# Patient Record
Sex: MALE | Race: WHITE | URBAN - NONMETROPOLITAN AREA
[De-identification: names, ages, dates, MRNs, and addresses within clinical notes are randomized per-mention and may not be internally consistent; named-entity substitution may affect disease eponyms.]

---

## 2024-04-19 ENCOUNTER — TELEPHONE (OUTPATIENT)
Dept: FAMILY MEDICINE CLINIC | Age: 84
End: 2024-04-19

## 2024-04-19 NOTE — TELEPHONE ENCOUNTER
----- Message from María Ponce sent at 4/19/2024  9:52 AM EDT -----  Regarding: ECC Appointment Request  ECC Appointment Request    Patient needs appointment for ECC Appointment Type: New to Provider.    Reason for Appointment Request: Requested Provider unavailable: the patient requesting for an appointment with Dr. Nevaeh Mckeon to be an establish provider.  --------------------------------------------------------------------------------------------------------------------------    Relationship to Patient: Guardian Son: Dick Lane    Call Back Information: OK to leave message on voicemail  Preferred Call Back Number: Phone [phone]

## 2024-04-19 NOTE — TELEPHONE ENCOUNTER
LVM for patient to return call. Nevaeh is currently not accepting new patients but we can offer appointment with Nickolas or Cassi.

## 2024-05-24 ENCOUNTER — OFFICE VISIT (OUTPATIENT)
Dept: ORTHOPEDIC SURGERY | Age: 84
End: 2024-05-24

## 2024-05-24 ENCOUNTER — OFFICE VISIT (OUTPATIENT)
Dept: FAMILY MEDICINE CLINIC | Age: 84
End: 2024-05-24

## 2024-05-24 VITALS
HEIGHT: 70 IN | WEIGHT: 286 LBS | SYSTOLIC BLOOD PRESSURE: 138 MMHG | DIASTOLIC BLOOD PRESSURE: 84 MMHG | BODY MASS INDEX: 40.94 KG/M2

## 2024-05-24 DIAGNOSIS — Z91.09 ENVIRONMENTAL ALLERGIES: ICD-10-CM

## 2024-05-24 DIAGNOSIS — I25.10 CORONARY ARTERY DISEASE INVOLVING NATIVE CORONARY ARTERY OF NATIVE HEART WITHOUT ANGINA PECTORIS: ICD-10-CM

## 2024-05-24 DIAGNOSIS — Z76.89 ENCOUNTER TO ESTABLISH CARE: Primary | ICD-10-CM

## 2024-05-24 DIAGNOSIS — E78.2 MIXED HYPERLIPIDEMIA: ICD-10-CM

## 2024-05-24 DIAGNOSIS — I10 PRIMARY HYPERTENSION: ICD-10-CM

## 2024-05-24 DIAGNOSIS — N40.1 BENIGN PROSTATIC HYPERPLASIA WITH NOCTURIA: ICD-10-CM

## 2024-05-24 DIAGNOSIS — R35.1 BENIGN PROSTATIC HYPERPLASIA WITH NOCTURIA: ICD-10-CM

## 2024-05-24 DIAGNOSIS — M25.551 BILATERAL HIP PAIN: ICD-10-CM

## 2024-05-24 DIAGNOSIS — M25.552 BILATERAL HIP PAIN: ICD-10-CM

## 2024-05-24 DIAGNOSIS — M25.512 CHRONIC LEFT SHOULDER PAIN: ICD-10-CM

## 2024-05-24 DIAGNOSIS — M25.561 RIGHT KNEE PAIN, UNSPECIFIED CHRONICITY: Primary | ICD-10-CM

## 2024-05-24 DIAGNOSIS — G89.29 CHRONIC LEFT SHOULDER PAIN: ICD-10-CM

## 2024-05-24 DIAGNOSIS — E66.01 CLASS 3 SEVERE OBESITY WITH SERIOUS COMORBIDITY AND BODY MASS INDEX (BMI) OF 40.0 TO 44.9 IN ADULT, UNSPECIFIED OBESITY TYPE (HCC): ICD-10-CM

## 2024-05-24 PROBLEM — E66.813 CLASS 3 SEVERE OBESITY WITH SERIOUS COMORBIDITY AND BODY MASS INDEX (BMI) OF 40.0 TO 44.9 IN ADULT: Status: ACTIVE | Noted: 2024-05-24

## 2024-05-24 RX ORDER — SOTALOL HYDROCHLORIDE 80 MG/1
80 TABLET ORAL DAILY
COMMUNITY
Start: 2024-05-16

## 2024-05-24 RX ORDER — ACETAMINOPHEN 500 MG
500 TABLET ORAL EVERY 6 HOURS PRN
COMMUNITY

## 2024-05-24 RX ORDER — APIXABAN 5 MG/1
5 TABLET, FILM COATED ORAL 2 TIMES DAILY
COMMUNITY
Start: 2024-05-23

## 2024-05-24 RX ORDER — GABAPENTIN 300 MG/1
300 CAPSULE ORAL 3 TIMES DAILY
COMMUNITY
Start: 2024-03-15

## 2024-05-24 RX ORDER — CETIRIZINE HYDROCHLORIDE 5 MG/1
5 TABLET ORAL DAILY
COMMUNITY

## 2024-05-24 RX ORDER — ROSUVASTATIN CALCIUM 5 MG/1
5 TABLET, COATED ORAL DAILY
COMMUNITY
Start: 2024-04-08

## 2024-05-24 RX ORDER — TAMSULOSIN HYDROCHLORIDE 0.4 MG/1
0.4 CAPSULE ORAL DAILY
COMMUNITY
Start: 2024-03-25

## 2024-05-24 SDOH — ECONOMIC STABILITY: FOOD INSECURITY: WITHIN THE PAST 12 MONTHS, THE FOOD YOU BOUGHT JUST DIDN'T LAST AND YOU DIDN'T HAVE MONEY TO GET MORE.: NEVER TRUE

## 2024-05-24 SDOH — ECONOMIC STABILITY: HOUSING INSECURITY
IN THE LAST 12 MONTHS, WAS THERE A TIME WHEN YOU DID NOT HAVE A STEADY PLACE TO SLEEP OR SLEPT IN A SHELTER (INCLUDING NOW)?: NO

## 2024-05-24 SDOH — ECONOMIC STABILITY: INCOME INSECURITY: HOW HARD IS IT FOR YOU TO PAY FOR THE VERY BASICS LIKE FOOD, HOUSING, MEDICAL CARE, AND HEATING?: NOT HARD AT ALL

## 2024-05-24 SDOH — ECONOMIC STABILITY: FOOD INSECURITY: WITHIN THE PAST 12 MONTHS, YOU WORRIED THAT YOUR FOOD WOULD RUN OUT BEFORE YOU GOT MONEY TO BUY MORE.: NEVER TRUE

## 2024-05-24 ASSESSMENT — ENCOUNTER SYMPTOMS
COUGH: 0
BACK PAIN: 1
CHEST TIGHTNESS: 0
CONSTIPATION: 0
EYE DISCHARGE: 0
EYE PAIN: 0
COLOR CHANGE: 0
SORE THROAT: 0
DIARRHEA: 0
ABDOMINAL DISTENTION: 0
ABDOMINAL PAIN: 0
SHORTNESS OF BREATH: 0

## 2024-05-24 ASSESSMENT — PATIENT HEALTH QUESTIONNAIRE - PHQ9
SUM OF ALL RESPONSES TO PHQ9 QUESTIONS 1 & 2: 2
SUM OF ALL RESPONSES TO PHQ QUESTIONS 1-9: 3
5. POOR APPETITE OR OVEREATING: NOT AT ALL
SUM OF ALL RESPONSES TO PHQ QUESTIONS 1-9: 3
SUM OF ALL RESPONSES TO PHQ QUESTIONS 1-9: 3
6. FEELING BAD ABOUT YOURSELF - OR THAT YOU ARE A FAILURE OR HAVE LET YOURSELF OR YOUR FAMILY DOWN: NOT AT ALL
7. TROUBLE CONCENTRATING ON THINGS, SUCH AS READING THE NEWSPAPER OR WATCHING TELEVISION: NOT AT ALL
10. IF YOU CHECKED OFF ANY PROBLEMS, HOW DIFFICULT HAVE THESE PROBLEMS MADE IT FOR YOU TO DO YOUR WORK, TAKE CARE OF THINGS AT HOME, OR GET ALONG WITH OTHER PEOPLE: NOT DIFFICULT AT ALL
1. LITTLE INTEREST OR PLEASURE IN DOING THINGS: NOT AT ALL
2. FEELING DOWN, DEPRESSED OR HOPELESS: MORE THAN HALF THE DAYS
4. FEELING TIRED OR HAVING LITTLE ENERGY: NOT AT ALL
3. TROUBLE FALLING OR STAYING ASLEEP: SEVERAL DAYS
SUM OF ALL RESPONSES TO PHQ QUESTIONS 1-9: 3
9. THOUGHTS THAT YOU WOULD BE BETTER OFF DEAD, OR OF HURTING YOURSELF: NOT AT ALL
8. MOVING OR SPEAKING SO SLOWLY THAT OTHER PEOPLE COULD HAVE NOTICED. OR THE OPPOSITE, BEING SO FIGETY OR RESTLESS THAT YOU HAVE BEEN MOVING AROUND A LOT MORE THAN USUAL: NOT AT ALL

## 2024-05-24 NOTE — PROGRESS NOTES
[facility] Medicine  Establish care visit   2024    Saw Lane (:  1940) is a 84 y.o. male, here to establish care.    Chief Complaint   Patient presents with    New Patient     [facility]     Leg Pain     Burning and itching        ASSESSMENT/ PLAN  1. Encounter to establish care  -Patient here to establish care    2. Primary hypertension  -Stable on sotalol    3. Coronary artery disease involving native coronary artery of native heart without angina pectoris  -Apparently has a history of x 1 stent.  Attempting to obtain records    4. Mixed hyperlipidemia  -History of coronary artery disease and x 1 cardiac stent  -Continue rosuvastatin 5 mg for now    5. Benign prostatic hyperplasia with nocturia  -Stable on Flomax    6. Environmental allergies  -Zyrtec as needed    7. Class 3 severe obesity with serious comorbidity and body mass index (BMI) of 40.0 to 44.9 in adult, unspecified obesity type (HCC)  -Spent about 15 minutes discussing dietary changes and lifestyle modifications to facilitate weight loss  - [place] [facility]  OBESITY 15M    8. Chronic left shoulder pain  -Following up with orthopedics    9. Bilateral hip pain  -Following up with orthopedics           I have personally spent 45 minutes reviewing chart, patient education, clinical care with patient and education to patient and family, as well as consulting with other team members and clinical references.      Imaging:    Return in about 3 months (around 2024) for Routine. .    HPI  Patient seen in office today to establish care.    Apparently he has medical history of coronary artery disease with 1 stent, BPH, hypertension, and numbness and tingling in bilateral lower extremities.    He is on Eliquis for unclear reasons.  He denies any history of blood clots or atrial fibrillation.    He does follow with Dr. Dobbs cardiologist.     He also has complaint of left shoulder pain that has been ongoing and chronic for several

## 2024-05-24 NOTE — PROGRESS NOTES
He came in today's just to say silke and to meet me in the say that he is getting records transferred down here for me to take care of him in the future.  Because of that because of his issues these had with healthcare system in [place] that he has been dealing with, I told him once he gets all of his records and a disc with his x-rays that he should return for follow-up.

## 2024-07-05 RX ORDER — SOTALOL HYDROCHLORIDE 80 MG/1
80 TABLET ORAL DAILY
Qty: 60 TABLET | Refills: 2 | Status: SHIPPED | OUTPATIENT
Start: 2024-07-05

## 2024-07-05 RX ORDER — ROSUVASTATIN CALCIUM 5 MG/1
5 TABLET, COATED ORAL DAILY
Qty: 30 TABLET | Refills: 2 | Status: SHIPPED | OUTPATIENT
Start: 2024-07-05

## 2024-07-23 ENCOUNTER — TELEMEDICINE (OUTPATIENT)
Dept: FAMILY MEDICINE CLINIC | Age: 84
End: 2024-07-23
Payer: MEDICARE

## 2024-07-23 DIAGNOSIS — N40.1 BENIGN PROSTATIC HYPERPLASIA WITH NOCTURIA: ICD-10-CM

## 2024-07-23 DIAGNOSIS — R35.1 BENIGN PROSTATIC HYPERPLASIA WITH NOCTURIA: ICD-10-CM

## 2024-07-23 DIAGNOSIS — E78.2 MIXED HYPERLIPIDEMIA: ICD-10-CM

## 2024-07-23 DIAGNOSIS — Z00.00 WELCOME TO MEDICARE PREVENTIVE VISIT: Primary | ICD-10-CM

## 2024-07-23 DIAGNOSIS — Z74.09 IMPAIRED MOBILITY AND ACTIVITIES OF DAILY LIVING: ICD-10-CM

## 2024-07-23 DIAGNOSIS — Z78.9 IMPAIRED MOBILITY AND ACTIVITIES OF DAILY LIVING: ICD-10-CM

## 2024-07-23 DIAGNOSIS — I10 PRIMARY HYPERTENSION: ICD-10-CM

## 2024-07-23 DIAGNOSIS — I25.10 CORONARY ARTERY DISEASE INVOLVING NATIVE CORONARY ARTERY OF NATIVE HEART WITHOUT ANGINA PECTORIS: ICD-10-CM

## 2024-07-23 PROCEDURE — G0402 INITIAL PREVENTIVE EXAM: HCPCS

## 2024-07-23 PROCEDURE — 1123F ACP DISCUSS/DSCN MKR DOCD: CPT

## 2024-07-23 NOTE — PROGRESS NOTES
Medicare Annual Wellness Visit    Saw Lane is here for No chief complaint on file.    Assessment & Plan   Welcome to Medicare preventive visit  -AWV today, complains of BLE leg pain and some back pain limiting ADLS and walking ability.    Primary hypertension  -stable on sotalol.     Coronary artery disease involving native coronary artery of native heart without angina pectoris  -x1 cardiac stent, no chest pain or SOB    Mixed hyperlipidemia  -Stable on crestor.   -continue    Benign prostatic hyperplasia with nocturia  -Stable on flomax    Impaired mobility and activities of daily living  -Using walker and cane  -using motor grocery cart.       Recommendations for Preventive Services Due: see orders and patient instructions/AVS.  Recommended screening schedule for the next 5-10 years is provided to the patient in written form: see Patient Instructions/AVS.     No follow-ups on file.     Subjective       Patient's complete Health Risk Assessment and screening values have been reviewed and are found in Flowsheets. The following problems were reviewed today and where indicated follow up appointments were made and/or referrals ordered.    Positive Risk Factor Screenings with Interventions:    Fall Risk:  Do you feel unsteady or are you worried about falling? : (!) yes  2 or more falls in past year?: (!) yes  Fall with injury in past year?: no     Interventions:    Reviewed medications, home hazards, visual acuity, and co-morbidities that can increase risk for falls  Using cane, walker and motorized grocery cart    Cognitive:      Words recalled: 2 Words Recalled     Total Score Interpretation: Abnormal Mini-Cog  Interventions:  Patient declines any further evaluation or treatment  Memory is grossly normal.     Depression:  PHQ-2 Score: 2  PHQ-9 Total Score: 7  Total Score Interpretation: 5-9 = mild depression  Interventions:  Patient declines any further evaluation or treatment          General HRA

## 2024-07-23 NOTE — PATIENT INSTRUCTIONS
review of your medical history including lifestyle, illnesses that may run in your family, and various assessments and screenings as appropriate.    After reviewing your medical record and screening and assessments performed today your provider may have ordered immunizations, labs, imaging, and/or referrals for you.  A list of these orders (if applicable) as well as your Preventive Care list are included within your After Visit Summary for your review.    Other Preventive Recommendations:    A preventive eye exam performed by an eye specialist is recommended every 1-2 years to screen for glaucoma; cataracts, macular degeneration, and other eye disorders.  A preventive dental visit is recommended every 6 months.  Try to get at least 150 minutes of exercise per week or 10,000 steps per day on a pedometer .  Order or download the FREE \"Exercise & Physical Activity: Your Everyday Guide\" from [employer]. Call 1-983.987.1432 or search [employer] online.  You need 1992-2253 mg of calcium and 4976-0046 IU of vitamin D per day. It is possible to meet your calcium requirement with diet alone, but a vitamin D supplement is usually necessary to meet this goal.  When exposed to the sun, use a sunscreen that protects against both UVA and UVB radiation with an SPF of 30 or greater. Reapply every 2 to 3 hours or after sweating, drying off with a towel, or swimming.  Always wear a seat belt when traveling in a car. Always wear a helmet when riding a bicycle or motorcycle.

## 2024-08-12 RX ORDER — ROSUVASTATIN CALCIUM 5 MG/1
5 TABLET, COATED ORAL DAILY
Qty: 90 TABLET | Refills: 0 | Status: SHIPPED | OUTPATIENT
Start: 2024-08-12 | End: 2024-08-15 | Stop reason: SDUPTHER

## 2024-08-15 ENCOUNTER — OFFICE VISIT (OUTPATIENT)
Dept: FAMILY MEDICINE CLINIC | Age: 84
End: 2024-08-15
Payer: MEDICARE

## 2024-08-15 VITALS — DIASTOLIC BLOOD PRESSURE: 78 MMHG | BODY MASS INDEX: 33.62 KG/M2 | SYSTOLIC BLOOD PRESSURE: 112 MMHG | WEIGHT: 231 LBS

## 2024-08-15 DIAGNOSIS — R35.1 BENIGN PROSTATIC HYPERPLASIA WITH NOCTURIA: ICD-10-CM

## 2024-08-15 DIAGNOSIS — E66.9 CLASS 1 OBESITY WITHOUT SERIOUS COMORBIDITY WITH BODY MASS INDEX (BMI) OF 33.0 TO 33.9 IN ADULT, UNSPECIFIED OBESITY TYPE: ICD-10-CM

## 2024-08-15 DIAGNOSIS — M25.552 CHRONIC LEFT HIP PAIN: ICD-10-CM

## 2024-08-15 DIAGNOSIS — N40.1 BENIGN PROSTATIC HYPERPLASIA WITH NOCTURIA: ICD-10-CM

## 2024-08-15 DIAGNOSIS — I10 PRIMARY HYPERTENSION: Primary | ICD-10-CM

## 2024-08-15 DIAGNOSIS — E78.2 MIXED HYPERLIPIDEMIA: ICD-10-CM

## 2024-08-15 DIAGNOSIS — G89.29 CHRONIC LEFT HIP PAIN: ICD-10-CM

## 2024-08-15 PROBLEM — E66.811 CLASS 1 OBESITY WITHOUT SERIOUS COMORBIDITY WITH BODY MASS INDEX (BMI) OF 33.0 TO 33.9 IN ADULT: Status: ACTIVE | Noted: 2024-05-24

## 2024-08-15 PROCEDURE — 3074F SYST BP LT 130 MM HG: CPT

## 2024-08-15 PROCEDURE — 1123F ACP DISCUSS/DSCN MKR DOCD: CPT

## 2024-08-15 PROCEDURE — 99214 OFFICE O/P EST MOD 30 MIN: CPT

## 2024-08-15 PROCEDURE — 3078F DIAST BP <80 MM HG: CPT

## 2024-08-15 PROCEDURE — G2211 COMPLEX E/M VISIT ADD ON: HCPCS

## 2024-08-15 RX ORDER — METHYLPREDNISOLONE 4 MG/1
TABLET ORAL
Qty: 1 KIT | Refills: 0 | Status: SHIPPED | OUTPATIENT
Start: 2024-08-15 | End: 2024-08-21

## 2024-08-15 RX ORDER — TAMSULOSIN HYDROCHLORIDE 0.4 MG/1
0.4 CAPSULE ORAL DAILY
Qty: 90 CAPSULE | Refills: 1 | Status: SHIPPED | OUTPATIENT
Start: 2024-08-15

## 2024-08-15 RX ORDER — ROSUVASTATIN CALCIUM 5 MG/1
5 TABLET, COATED ORAL DAILY
Qty: 90 TABLET | Refills: 1 | Status: SHIPPED | OUTPATIENT
Start: 2024-08-15

## 2024-08-15 RX ORDER — TRAMADOL HYDROCHLORIDE 50 MG/1
50 TABLET ORAL EVERY 4 HOURS PRN
Qty: 18 TABLET | Refills: 0 | Status: SHIPPED | OUTPATIENT
Start: 2024-08-15 | End: 2024-08-18

## 2024-08-15 ASSESSMENT — ENCOUNTER SYMPTOMS
CONSTIPATION: 0
EYE PAIN: 0
COUGH: 0
ABDOMINAL PAIN: 0
CHEST TIGHTNESS: 0
DIARRHEA: 0
BACK PAIN: 1
SHORTNESS OF BREATH: 0
EYE DISCHARGE: 0
ABDOMINAL DISTENTION: 0
SORE THROAT: 0
COLOR CHANGE: 0

## 2024-08-15 NOTE — PROGRESS NOTES
[facility]    8/15/2024    Saw Lane (:  1940) is a 84 y.o. male, here for routine follow-up    Chief Complaint   Patient presents with    Hypertension    Pain        ASSESSMENT/ PLAN  1. Primary hypertension  -Stable on sotalol    2. Class 1 obesity without serious comorbidity with body mass index (BMI) of 33.0 to 33.9 in adult, unspecified obesity type  -Patient down 5 pounds since last visit  -Reiterated dietary change    3. Chronic left hip pain  -Tenderness to left hip  -Suspicious for OA of the left hip.  Referral to orthopedics  - Chandler Winter MD, Orthopedic Surgery (Shoulder; Hip; Knee), [place]  - methylPREDNISolone (MEDROL DOSEPACK) 4 MG tablet; Take by mouth.  Dispense: 1 kit; Refill: 0  - traMADol (ULTRAM) 50 MG tablet; Take 1 tablet by mouth every 4 hours as needed for Pain for up to 3 days. Intended supply: 3 days. Take lowest dose possible to manage pain Max Daily Amount: 300 mg  Dispense: 18 tablet; Refill: 0    4. Mixed hyperlipidemia  -Refill rosuvastatin  - rosuvastatin (CRESTOR) 5 MG tablet; Take 1 tablet by mouth daily  Dispense: 90 tablet; Refill: 1    5. Benign prostatic hyperplasia with nocturia  -Stable on Flomax.  - tamsulosin (FLOMAX) 0.4 MG capsule; Take 1 capsule by mouth daily  Dispense: 90 capsule; Refill: 1  Imaging:    Return in about 3 months (around 11/15/2024) for Routine, ELIQUIS???.    HPI  Patient seen in office today to establish care.    Apparently he has medical history of coronary artery disease with 1 stent, BPH, hypertension, and numbness and tingling in bilateral lower extremities.    He is on Eliquis for unclear reasons.  He denies any history of blood clots or atrial fibrillation.    He does follow with Dr. Dobbs cardiologist.     He also has complaint of left shoulder pain that has been ongoing and chronic for several years.  He did receive a corticosteroid injection in the shoulder in the past that did help with left

## 2024-09-06 ENCOUNTER — OFFICE VISIT (OUTPATIENT)
Dept: ORTHOPEDIC SURGERY | Age: 84
End: 2024-09-06
Payer: MEDICARE

## 2024-09-06 VITALS — BODY MASS INDEX: 34.21 KG/M2 | HEIGHT: 69 IN | WEIGHT: 231 LBS

## 2024-09-06 DIAGNOSIS — M16.12 PRIMARY OSTEOARTHRITIS OF LEFT HIP: Primary | ICD-10-CM

## 2024-09-06 DIAGNOSIS — G89.29 BILATERAL CHRONIC KNEE PAIN: ICD-10-CM

## 2024-09-06 DIAGNOSIS — M25.552 LEFT HIP PAIN: ICD-10-CM

## 2024-09-06 DIAGNOSIS — M25.562 BILATERAL CHRONIC KNEE PAIN: ICD-10-CM

## 2024-09-06 DIAGNOSIS — M25.561 BILATERAL CHRONIC KNEE PAIN: ICD-10-CM

## 2024-09-06 PROCEDURE — 99203 OFFICE O/P NEW LOW 30 MIN: CPT | Performed by: ORTHOPAEDIC SURGERY

## 2024-09-06 PROCEDURE — 1123F ACP DISCUSS/DSCN MKR DOCD: CPT | Performed by: ORTHOPAEDIC SURGERY

## 2024-09-06 RX ORDER — TRAMADOL HYDROCHLORIDE 50 MG/1
50 TABLET ORAL EVERY 6 HOURS PRN
Qty: 28 TABLET | Refills: 0 | Status: SHIPPED | OUTPATIENT
Start: 2024-09-06 | End: 2024-09-13

## 2024-09-06 RX ORDER — METHYLPREDNISOLONE 4 MG
TABLET, DOSE PACK ORAL
Qty: 1 KIT | Refills: 1 | Status: SHIPPED | OUTPATIENT
Start: 2024-09-06 | End: 2024-09-12

## 2024-09-06 NOTE — PROGRESS NOTES
unremarkable    Strength: Limited by pain left hip    Special Tests:      Skin: There are no rashes, ulcerations or lesions.    Gait: Very antalgic favoring the left side and using a cane for ambulation    Reflex unremarkable    Additional Comments:       Additional Examinations:         Right Upper Extremity:  Examination of the right upper extremity does not show any tenderness, deformity or injury.  Range of motion is unremarkable.  There is no gross instability.  There are no rashes, ulcerations or lesions.  Strength and tone are normal.    Radiology:     X-rays 3 views of the left hip reveals end-stage osteoarthritis of the left hip    X-rays 3 views of both knees are unremarkable for acute or chronic pathology       Assessment : End-stage arthritis left hip with compensatory pain in both knees from altered gait    Impression:  Encounter Diagnoses   Name Primary?    Left hip pain Yes    Left knee pain, unspecified chronicity        Office Procedures:  Orders Placed This Encounter   Procedures    XR HIP LEFT (2-3 VIEWS)     Standing Status:   Future     Number of Occurrences:   1     Standing Expiration Date:   9/6/2025    XR KNEE LEFT (3 VIEWS)     Standing Status:   Future     Number of Occurrences:   1     Standing Expiration Date:   9/6/2025    Kee Perkins DO, Orthopedics and Sports Medicine (Hip; Knee; Shoulder)[facility]     Referral Priority:   Routine     Referral Type:   Eval and Treat     Referral Reason:   Specialty Services Required     Referred to Provider:   Kee Youngblood DO     Requested Specialty:   Orthopedic Surgery     Number of Visits Requested:   1       Treatment Plan: At this time I recommend he be given some tramadol to use sparingly for pain, a Medrol Dosepak that he can take and although he requested cortisone injections to his hip I told him that I am going to refer him to Dr. Kee Youngblood for evaluation and consideration of the total hip replacement surgery.

## 2024-09-18 ENCOUNTER — TELEMEDICINE (OUTPATIENT)
Dept: FAMILY MEDICINE CLINIC | Age: 84
End: 2024-09-18

## 2024-09-18 DIAGNOSIS — Z74.09 IMPAIRED MOBILITY AND ACTIVITIES OF DAILY LIVING: ICD-10-CM

## 2024-09-18 DIAGNOSIS — G89.29 CHRONIC LEFT HIP PAIN: ICD-10-CM

## 2024-09-18 DIAGNOSIS — M25.552 CHRONIC LEFT HIP PAIN: ICD-10-CM

## 2024-09-18 DIAGNOSIS — Z78.9 IMPAIRED MOBILITY AND ACTIVITIES OF DAILY LIVING: ICD-10-CM

## 2024-09-18 DIAGNOSIS — E78.2 MIXED HYPERLIPIDEMIA: ICD-10-CM

## 2024-09-18 DIAGNOSIS — Z00.00 WELCOME TO MEDICARE PREVENTIVE VISIT: Primary | ICD-10-CM

## 2024-09-18 DIAGNOSIS — I48.0 PAROXYSMAL ATRIAL FIBRILLATION (HCC): ICD-10-CM

## 2024-09-18 ASSESSMENT — PATIENT HEALTH QUESTIONNAIRE - PHQ9
8. MOVING OR SPEAKING SO SLOWLY THAT OTHER PEOPLE COULD HAVE NOTICED. OR THE OPPOSITE, BEING SO FIGETY OR RESTLESS THAT YOU HAVE BEEN MOVING AROUND A LOT MORE THAN USUAL: NOT AT ALL
SUM OF ALL RESPONSES TO PHQ QUESTIONS 1-9: 5
3. TROUBLE FALLING OR STAYING ASLEEP: NOT AT ALL
4. FEELING TIRED OR HAVING LITTLE ENERGY: SEVERAL DAYS
5. POOR APPETITE OR OVEREATING: NOT AT ALL
6. FEELING BAD ABOUT YOURSELF - OR THAT YOU ARE A FAILURE OR HAVE LET YOURSELF OR YOUR FAMILY DOWN: NOT AT ALL
SUM OF ALL RESPONSES TO PHQ9 QUESTIONS 1 & 2: 4
2. FEELING DOWN, DEPRESSED OR HOPELESS: SEVERAL DAYS
SUM OF ALL RESPONSES TO PHQ QUESTIONS 1-9: 5
SUM OF ALL RESPONSES TO PHQ QUESTIONS 1-9: 5
10. IF YOU CHECKED OFF ANY PROBLEMS, HOW DIFFICULT HAVE THESE PROBLEMS MADE IT FOR YOU TO DO YOUR WORK, TAKE CARE OF THINGS AT HOME, OR GET ALONG WITH OTHER PEOPLE: NOT DIFFICULT AT ALL
7. TROUBLE CONCENTRATING ON THINGS, SUCH AS READING THE NEWSPAPER OR WATCHING TELEVISION: NOT AT ALL
SUM OF ALL RESPONSES TO PHQ QUESTIONS 1-9: 5
9. THOUGHTS THAT YOU WOULD BE BETTER OFF DEAD, OR OF HURTING YOURSELF: NOT AT ALL
1. LITTLE INTEREST OR PLEASURE IN DOING THINGS: NEARLY EVERY DAY

## 2024-09-18 ASSESSMENT — LIFESTYLE VARIABLES: HOW OFTEN DO YOU HAVE A DRINK CONTAINING ALCOHOL: NEVER

## 2024-09-20 ENCOUNTER — OFFICE VISIT (OUTPATIENT)
Dept: ORTHOPEDIC SURGERY | Age: 84
End: 2024-09-20
Payer: MEDICARE

## 2024-09-20 VITALS — WEIGHT: 230 LBS | BODY MASS INDEX: 34.07 KG/M2 | HEIGHT: 69 IN

## 2024-09-20 DIAGNOSIS — M16.12 PRIMARY OSTEOARTHRITIS OF LEFT HIP: Primary | ICD-10-CM

## 2024-09-20 PROCEDURE — 99214 OFFICE O/P EST MOD 30 MIN: CPT | Performed by: STUDENT IN AN ORGANIZED HEALTH CARE EDUCATION/TRAINING PROGRAM

## 2024-09-20 PROCEDURE — 1123F ACP DISCUSS/DSCN MKR DOCD: CPT | Performed by: STUDENT IN AN ORGANIZED HEALTH CARE EDUCATION/TRAINING PROGRAM

## 2024-09-24 ENCOUNTER — PREP FOR PROCEDURE (OUTPATIENT)
Dept: ORTHOPEDIC SURGERY | Age: 84
End: 2024-09-24

## 2024-09-24 DIAGNOSIS — M16.12 PRIMARY OSTEOARTHRITIS OF LEFT HIP: Primary | ICD-10-CM

## 2024-09-26 ENCOUNTER — ANESTHESIA EVENT (OUTPATIENT)
Dept: OPERATING ROOM | Age: 84
End: 2024-09-26
Payer: MEDICARE

## 2024-10-02 ENCOUNTER — OFFICE VISIT (OUTPATIENT)
Dept: FAMILY MEDICINE CLINIC | Age: 84
End: 2024-10-02
Payer: MEDICARE

## 2024-10-02 VITALS
BODY MASS INDEX: 35.29 KG/M2 | HEART RATE: 61 BPM | DIASTOLIC BLOOD PRESSURE: 82 MMHG | WEIGHT: 239 LBS | SYSTOLIC BLOOD PRESSURE: 127 MMHG

## 2024-10-02 DIAGNOSIS — M25.552 CHRONIC LEFT HIP PAIN: ICD-10-CM

## 2024-10-02 DIAGNOSIS — M16.12 PRIMARY OSTEOARTHRITIS OF LEFT HIP: ICD-10-CM

## 2024-10-02 DIAGNOSIS — Z01.818 PREOP EXAMINATION: Primary | ICD-10-CM

## 2024-10-02 DIAGNOSIS — I48.0 PAROXYSMAL ATRIAL FIBRILLATION (HCC): ICD-10-CM

## 2024-10-02 DIAGNOSIS — G89.29 CHRONIC LEFT HIP PAIN: ICD-10-CM

## 2024-10-02 PROCEDURE — 99214 OFFICE O/P EST MOD 30 MIN: CPT

## 2024-10-02 PROCEDURE — 3079F DIAST BP 80-89 MM HG: CPT

## 2024-10-02 PROCEDURE — 1123F ACP DISCUSS/DSCN MKR DOCD: CPT

## 2024-10-02 PROCEDURE — 3074F SYST BP LT 130 MM HG: CPT

## 2024-10-02 ASSESSMENT — ENCOUNTER SYMPTOMS
CHEST TIGHTNESS: 0
COLOR CHANGE: 0
COUGH: 0
CONSTIPATION: 0
SORE THROAT: 0
ABDOMINAL DISTENTION: 0
BACK PAIN: 1
SHORTNESS OF BREATH: 0
EYE PAIN: 0
DIARRHEA: 0
ABDOMINAL PAIN: 0
EYE DISCHARGE: 0

## 2024-10-02 NOTE — PROGRESS NOTES
Pre Op Med History  Cold/Chronic Cough/Tuberculosis  --  no  Bronchitis/COPD  --  no  Asthma/SOB  --  no  Rheumatic Fever/Heart Murmur  --  no  High Blood Pressure/Low Blood Pressure  --  no  Swelling of Feet/Fluid In Lungs  --  yes swelling in feet  Heart Attack/Chest Pain  --  no  Irregular, Fast Heartbeats  --  yes - afib  Do you bruise easily?  --  yes - blood thinner  Anemia  --  no  Diabetes/Low Blood Sugar  --  no  Are you Pregnant  --  N/A  Last Menstrual Period  --  N/A  Serious Illness During Pregnancy  --  N/A  Breast Disease  --  no  Kidney Disease  --  yes - kidney stone  Jaundice/Hepatitis  --  no  Hiatal Hernia/Peptic Ulcer Disease  --  yes - hernia  Convulsions/Epilepsy/Stroke  --  no  Polio/Meningitis Paralysis  --  no  Back Pain/ Slipped Disc  --  yes - back pain  Psychological Disease  --  no  Thyroid Disease  --  no  Glaucoma/Visual Impairment  --  no  Skeletal Deformities/Defects/Arthritis --  no  Loose Teeth/Caps on Front Teeth  --  yes - dentures  Have you had any Surgery  --  yes - No past surgical history on file.   Any History of anesthesia complication in self or family  -- no  Prostate Disease  --  no  Cancer  --  no  DVT/PE/PVD --  no  Weight Loss/Gain  --  no     Saw Lane  YOB: 1940    Date of Service:  10/2/2024      Wt Readings from Last 2 Encounters:   10/02/24 108.4 kg (239 lb)   09/20/24 104.3 kg (230 lb)       BP Readings from Last 3 Encounters:   10/02/24 127/82   08/15/24 112/78   05/24/24 138/84        Chief Complaint   Patient presents with    Pre-op Exam     Left hip  Dr Rylie Sanchez       No Known Allergies    Outpatient Medications Marked as Taking for the 10/2/24 encounter (Office Visit) with Alfonso Grijalva APRN - CNP   Medication Sig Dispense Refill    rosuvastatin (CRESTOR) 5 MG tablet Take 1 tablet by mouth daily 90 tablet 1    tamsulosin (FLOMAX) 0.4 MG capsule Take 1 capsule by mouth daily 90 capsule 1    sotalol (BETAPACE) 80 MG

## 2024-10-04 ENCOUNTER — TELEPHONE (OUTPATIENT)
Dept: ORTHOPEDIC SURGERY | Age: 84
End: 2024-10-04

## 2024-10-04 ENCOUNTER — HOSPITAL ENCOUNTER (OUTPATIENT)
Age: 84
Discharge: HOME | End: 2024-10-04
Payer: MEDICARE

## 2024-10-04 DIAGNOSIS — Z01.818 PRE-OP EVALUATION: Primary | ICD-10-CM

## 2024-10-04 LAB
ALBUMIN SERPL-MCNC: 4.3 G/DL (ref 3.4–5)
ANION GAP SERPL CALCULATED.3IONS-SCNC: 12 MMOL/L (ref 3–16)
APTT BLD: 28.8 SEC (ref 22.1–36.4)
BACTERIA URNS QL MICRO: ABNORMAL /HPF
BASOPHILS # BLD: 0 K/UL (ref 0–0.2)
BASOPHILS NFR BLD: 0.6 %
BILIRUB UR QL STRIP.AUTO: NEGATIVE
BUN SERPL-MCNC: 16 MG/DL (ref 7–20)
CALCIUM SERPL-MCNC: 10 MG/DL (ref 8.3–10.6)
CHLORIDE SERPL-SCNC: 103 MMOL/L (ref 99–110)
CLARITY UR: CLEAR
CO2 SERPL-SCNC: 24 MMOL/L (ref 21–32)
COLOR UR: YELLOW
CREAT SERPL-MCNC: 1 MG/DL (ref 0.8–1.3)
DEPRECATED RDW RBC AUTO: 15.9 % (ref 12.4–15.4)
EOSINOPHIL # BLD: 0.1 K/UL (ref 0–0.6)
EOSINOPHIL NFR BLD: 2.3 %
EPI CELLS #/AREA URNS HPF: ABNORMAL /HPF (ref 0–5)
GFR SERPLBLD CREATININE-BSD FMLA CKD-EPI: 75 ML/MIN/{1.73_M2}
GLUCOSE SERPL-MCNC: 103 MG/DL (ref 70–99)
GLUCOSE UR STRIP.AUTO-MCNC: NEGATIVE MG/DL
HCT VFR BLD AUTO: 41.4 % (ref 40.5–52.5)
HGB BLD-MCNC: 13.5 G/DL (ref 13.5–17.5)
HGB UR QL STRIP.AUTO: ABNORMAL
INR PPP: 1.17 (ref 0.85–1.15)
KETONES UR STRIP.AUTO-MCNC: NEGATIVE MG/DL
LEUKOCYTE ESTERASE UR QL STRIP.AUTO: NEGATIVE
LYMPHOCYTES # BLD: 1.1 K/UL (ref 1–5.1)
LYMPHOCYTES NFR BLD: 21.2 %
MCH RBC QN AUTO: 31 PG (ref 26–34)
MCHC RBC AUTO-ENTMCNC: 32.7 G/DL (ref 31–36)
MCV RBC AUTO: 94.7 FL (ref 80–100)
MONOCYTES # BLD: 0.5 K/UL (ref 0–1.3)
MONOCYTES NFR BLD: 9.5 %
NEUTROPHILS # BLD: 3.4 K/UL (ref 1.7–7.7)
NEUTROPHILS NFR BLD: 66.4 %
NITRITE UR QL STRIP.AUTO: NEGATIVE
PH UR STRIP.AUTO: 6 [PH] (ref 5–8)
PLATELET # BLD AUTO: 130 K/UL (ref 135–450)
PMV BLD AUTO: 9.6 FL (ref 5–10.5)
POTASSIUM SERPL-SCNC: 4.9 MMOL/L (ref 3.5–5.1)
PROT UR STRIP.AUTO-MCNC: NEGATIVE MG/DL
PROTHROMBIN TIME: 15.1 SEC (ref 11.9–14.9)
RBC # BLD AUTO: 4.37 M/UL (ref 4.2–5.9)
RBC #/AREA URNS HPF: ABNORMAL /HPF (ref 0–4)
SODIUM SERPL-SCNC: 139 MMOL/L (ref 136–145)
SP GR UR STRIP.AUTO: 1.02 (ref 1–1.03)
UA DIPSTICK W REFLEX MICRO PNL UR: YES
URN SPEC COLLECT METH UR: ABNORMAL
UROBILINOGEN UR STRIP-ACNC: 0.2 E.U./DL
WBC # BLD AUTO: 5.1 K/UL (ref 4–11)
WBC #/AREA URNS HPF: ABNORMAL /HPF (ref 0–5)

## 2024-10-04 PROCEDURE — 85730 THROMBOPLASTIN TIME PARTIAL: CPT

## 2024-10-04 PROCEDURE — 87086 URINE CULTURE/COLONY COUNT: CPT

## 2024-10-04 PROCEDURE — 80048 BASIC METABOLIC PNL TOTAL CA: CPT

## 2024-10-04 PROCEDURE — 81001 URINALYSIS AUTO W/SCOPE: CPT

## 2024-10-04 PROCEDURE — 85610 PROTHROMBIN TIME: CPT

## 2024-10-04 PROCEDURE — 82040 ASSAY OF SERUM ALBUMIN: CPT

## 2024-10-04 PROCEDURE — 82306 VITAMIN D 25 HYDROXY: CPT

## 2024-10-04 PROCEDURE — 85025 COMPLETE CBC W/AUTO DIFF WBC: CPT

## 2024-10-04 PROCEDURE — 36415 COLL VENOUS BLD VENIPUNCTURE: CPT

## 2024-10-04 NOTE — TELEPHONE ENCOUNTER
General Question     Subject: BLOOD WORK   Patient and /or Facility Request: Saw Lane   Contact Number: [phone]      PATIENT REQUESTING A CALL BACK FROM SOMEONE IN DR HA'S OFFICE REGARDING HIS BLOOD WORK     PLEASE CALL THE PATIENT BACK AT THE ABOVE NUMBER

## 2024-10-05 LAB — 25(OH)D3 SERPL-MCNC: 22.3 NG/ML

## 2024-10-06 LAB — BACTERIA UR CULT: NORMAL

## 2024-10-07 ENCOUNTER — TELEPHONE (OUTPATIENT)
Dept: ORTHOPEDIC SURGERY | Age: 84
End: 2024-10-07

## 2024-10-07 DIAGNOSIS — E55.9 VITAMIN D DEFICIENCY: Primary | ICD-10-CM

## 2024-10-07 RX ORDER — ERGOCALCIFEROL 1.25 MG/1
50000 CAPSULE, LIQUID FILLED ORAL WEEKLY
Qty: 6 CAPSULE | Refills: 0 | Status: SHIPPED | OUTPATIENT
Start: 2024-10-07

## 2024-10-07 NOTE — TELEPHONE ENCOUNTER
----- Message from Dr. Kee Youngblood DO sent at 10/5/2024  8:30 AM EDT -----  Please send the patient Vitamin D 50K weekly for 6 weeks, his levels are low.    AK  ----- Message -----  From: Rogeroh Incoming Lab Results From Soft (Epic Adt)  Sent: 10/4/2024   1:30 PM EDT  To: Kee Youngblood DO

## 2024-10-08 NOTE — PROGRESS NOTES

## 2024-10-09 ENCOUNTER — TELEPHONE (OUTPATIENT)
Dept: ORTHOPEDIC SURGERY | Age: 84
End: 2024-10-09

## 2024-10-09 DIAGNOSIS — M16.12 PRIMARY OSTEOARTHRITIS OF LEFT HIP: Primary | ICD-10-CM

## 2024-10-09 RX ORDER — MUPIROCIN 20 MG/G
OINTMENT TOPICAL
Qty: 1 G | Refills: 0 | Status: SHIPPED | OUTPATIENT
Start: 2024-10-09

## 2024-10-09 NOTE — TELEPHONE ENCOUNTER
Orthopedic Nurse Navigator Summary    Patient Name:  Saw Lane   Anticipated Date of Surgery:  10/21/24  Attended Pre-op Education Class:  Education sent to patient email   PCP: Alfonso Grijalva CNP  Date of PCP visit for H&P: 10/02/24  Is patient in a Pain Management program:  Review of Medical history reveals history of: CAD, PAF, HTN, Hyperlipidemia, Cardiac murmur, Chronic ischemic heart disease, BPH with nocturia    Critical Lab Values  - Hemoglobin (g/dL):  Date: 10/04/24 Value 13.5  - Hematocrit(%): Date: 10/04/24  Value 41.4  - HgbA1C:  Date:  Value  - Albumin:  Date: 10/04/24  Value 4.3  - BUN:  Date: 10/04/24  Value 16  - Creatinine:  Date: 10/04/24  Value 1.0    Coronary Artery Disease/HTN/CHF history: Yes  Does the patient see a Cardiologist: Darius Dobbs MD  Date of most recent cardiac appt: 10/03/24  On any anticoagulation:  Eliquis 5 mg BID    Diabetes History:  No  Most recent HgbA1C:  Pulmonary:  COPD/Emphysema/Use of home oxygen: No  Alcohol use: No    BMI greater than 40 at time of scheduling:  No BMI- 35.29  Additional medical concerns:  Additional recommendations for above concerns:  Attended Pre-Hab program:    Anticipated Discharge Disposition:  Home with [facility]  Who will be with patient at home following discharge:  He currently lives alone but is in the process of selling his home. He should be moving in with his son around 10/19/24.  His son will bring to surgery and provide care.  Equipment patient already has:  walker, cane  Bedroom on first or second floor:  first  Bathroom on first or second floor:  first  Weight bearing status:  wbat  Pre-op ambulatory status: painful ambulation  Number of entry steps:  2  Caregiver assistance:  full time    Ernestine Steve RN  Date:   10/09/24

## 2024-10-09 NOTE — TELEPHONE ENCOUNTER
Prescription Refill     Medication Name:  BACTROBAN NASAL   Pharmacy: [facility] [address]   Patient Contact Number:  [phone]     PATIENT NEEDS TO TAKE OINTMENT 5 DAY PRIOR TO ALINA     SURGERY ON 10/21/24    PLEASE CALL PATIENT AT THE ABOVE NUMBER

## 2024-10-21 ENCOUNTER — ANESTHESIA (OUTPATIENT)
Dept: OPERATING ROOM | Age: 84
End: 2024-10-21
Payer: MEDICARE

## 2024-10-21 ENCOUNTER — APPOINTMENT (OUTPATIENT)
Dept: GENERAL RADIOLOGY | Age: 84
End: 2024-10-21
Attending: STUDENT IN AN ORGANIZED HEALTH CARE EDUCATION/TRAINING PROGRAM
Payer: MEDICARE

## 2024-10-21 ENCOUNTER — HOSPITAL ENCOUNTER (OUTPATIENT)
Age: 84
Setting detail: OUTPATIENT SURGERY
Discharge: HOME | End: 2024-10-21
Attending: STUDENT IN AN ORGANIZED HEALTH CARE EDUCATION/TRAINING PROGRAM | Admitting: STUDENT IN AN ORGANIZED HEALTH CARE EDUCATION/TRAINING PROGRAM
Payer: MEDICARE

## 2024-10-21 VITALS
HEIGHT: 69 IN | RESPIRATION RATE: 14 BRPM | OXYGEN SATURATION: 98 % | TEMPERATURE: 96.9 F | HEART RATE: 58 BPM | BODY MASS INDEX: 35.4 KG/M2 | WEIGHT: 239 LBS | DIASTOLIC BLOOD PRESSURE: 64 MMHG | SYSTOLIC BLOOD PRESSURE: 167 MMHG

## 2024-10-21 DIAGNOSIS — M16.12 PRIMARY OSTEOARTHRITIS OF LEFT HIP: Primary | ICD-10-CM

## 2024-10-21 LAB
ABO + RH BLD: NORMAL
BLD GP AB SCN SERPL QL: NORMAL

## 2024-10-21 PROCEDURE — 73502 X-RAY EXAM HIP UNI 2-3 VIEWS: CPT

## 2024-10-21 PROCEDURE — 3700000000 HC ANESTHESIA ATTENDED CARE: Performed by: STUDENT IN AN ORGANIZED HEALTH CARE EDUCATION/TRAINING PROGRAM

## 2024-10-21 PROCEDURE — 6360000002 HC RX W HCPCS: Performed by: NURSE ANESTHETIST, CERTIFIED REGISTERED

## 2024-10-21 PROCEDURE — 6360000002 HC RX W HCPCS: Performed by: STUDENT IN AN ORGANIZED HEALTH CARE EDUCATION/TRAINING PROGRAM

## 2024-10-21 PROCEDURE — 2720000010 HC SURG SUPPLY STERILE: Performed by: STUDENT IN AN ORGANIZED HEALTH CARE EDUCATION/TRAINING PROGRAM

## 2024-10-21 PROCEDURE — 2580000003 HC RX 258: Performed by: NURSE ANESTHETIST, CERTIFIED REGISTERED

## 2024-10-21 PROCEDURE — A4217 STERILE WATER/SALINE, 500 ML: HCPCS | Performed by: STUDENT IN AN ORGANIZED HEALTH CARE EDUCATION/TRAINING PROGRAM

## 2024-10-21 PROCEDURE — 97161 PT EVAL LOW COMPLEX 20 MIN: CPT

## 2024-10-21 PROCEDURE — 2580000003 HC RX 258: Performed by: STUDENT IN AN ORGANIZED HEALTH CARE EDUCATION/TRAINING PROGRAM

## 2024-10-21 PROCEDURE — 7100000010 HC PHASE II RECOVERY - FIRST 15 MIN: Performed by: STUDENT IN AN ORGANIZED HEALTH CARE EDUCATION/TRAINING PROGRAM

## 2024-10-21 PROCEDURE — 86850 RBC ANTIBODY SCREEN: CPT

## 2024-10-21 PROCEDURE — 3600000014 HC SURGERY LEVEL 4 ADDTL 15MIN: Performed by: STUDENT IN AN ORGANIZED HEALTH CARE EDUCATION/TRAINING PROGRAM

## 2024-10-21 PROCEDURE — 76942 ECHO GUIDE FOR BIOPSY: CPT | Performed by: ANESTHESIOLOGY

## 2024-10-21 PROCEDURE — 2580000003 HC RX 258: Performed by: ANESTHESIOLOGY

## 2024-10-21 PROCEDURE — 97530 THERAPEUTIC ACTIVITIES: CPT

## 2024-10-21 PROCEDURE — 27130 TOTAL HIP ARTHROPLASTY: CPT | Performed by: STUDENT IN AN ORGANIZED HEALTH CARE EDUCATION/TRAINING PROGRAM

## 2024-10-21 PROCEDURE — 7100000000 HC PACU RECOVERY - FIRST 15 MIN: Performed by: STUDENT IN AN ORGANIZED HEALTH CARE EDUCATION/TRAINING PROGRAM

## 2024-10-21 PROCEDURE — 2709999900 HC NON-CHARGEABLE SUPPLY: Performed by: STUDENT IN AN ORGANIZED HEALTH CARE EDUCATION/TRAINING PROGRAM

## 2024-10-21 PROCEDURE — 27130 TOTAL HIP ARTHROPLASTY: CPT | Performed by: PHYSICIAN ASSISTANT

## 2024-10-21 PROCEDURE — 7100000011 HC PHASE II RECOVERY - ADDTL 15 MIN: Performed by: STUDENT IN AN ORGANIZED HEALTH CARE EDUCATION/TRAINING PROGRAM

## 2024-10-21 PROCEDURE — C1776 JOINT DEVICE (IMPLANTABLE): HCPCS | Performed by: STUDENT IN AN ORGANIZED HEALTH CARE EDUCATION/TRAINING PROGRAM

## 2024-10-21 PROCEDURE — 7100000001 HC PACU RECOVERY - ADDTL 15 MIN: Performed by: STUDENT IN AN ORGANIZED HEALTH CARE EDUCATION/TRAINING PROGRAM

## 2024-10-21 PROCEDURE — 86900 BLOOD TYPING SEROLOGIC ABO: CPT

## 2024-10-21 PROCEDURE — 36415 COLL VENOUS BLD VENIPUNCTURE: CPT

## 2024-10-21 PROCEDURE — 86901 BLOOD TYPING SEROLOGIC RH(D): CPT

## 2024-10-21 PROCEDURE — 6360000002 HC RX W HCPCS: Performed by: ANESTHESIOLOGY

## 2024-10-21 PROCEDURE — 2500000003 HC RX 250 WO HCPCS: Performed by: ANESTHESIOLOGY

## 2024-10-21 PROCEDURE — 6370000000 HC RX 637 (ALT 250 FOR IP): Performed by: STUDENT IN AN ORGANIZED HEALTH CARE EDUCATION/TRAINING PROGRAM

## 2024-10-21 PROCEDURE — 2500000003 HC RX 250 WO HCPCS: Performed by: NURSE ANESTHETIST, CERTIFIED REGISTERED

## 2024-10-21 PROCEDURE — 2500000003 HC RX 250 WO HCPCS: Performed by: STUDENT IN AN ORGANIZED HEALTH CARE EDUCATION/TRAINING PROGRAM

## 2024-10-21 PROCEDURE — 3700000001 HC ADD 15 MINUTES (ANESTHESIA): Performed by: STUDENT IN AN ORGANIZED HEALTH CARE EDUCATION/TRAINING PROGRAM

## 2024-10-21 PROCEDURE — 3600000004 HC SURGERY LEVEL 4 BASE: Performed by: STUDENT IN AN ORGANIZED HEALTH CARE EDUCATION/TRAINING PROGRAM

## 2024-10-21 DEVICE — BIOLOX® DELTA, CERAMIC FEMORAL HEAD, M, Ø 40/0, TAPER 12/14
Type: IMPLANTABLE DEVICE | Site: HIP | Status: FUNCTIONAL
Brand: BIOLOX® DELTA

## 2024-10-21 DEVICE — IMPLANTABLE DEVICE
Type: IMPLANTABLE DEVICE | Site: HIP | Status: FUNCTIONAL
Brand: G7® ACETABULAR SYSTEM

## 2024-10-21 DEVICE — IMPLANTABLE DEVICE
Type: IMPLANTABLE DEVICE | Site: HIP | Status: FUNCTIONAL
Brand: AVENIR COMPLETE™

## 2024-10-21 DEVICE — IMPLANTABLE DEVICE
Type: IMPLANTABLE DEVICE | Site: HIP | Status: FUNCTIONAL
Brand: G7® VIVACIT-E®

## 2024-10-21 RX ORDER — HYDROMORPHONE HYDROCHLORIDE 2 MG/ML
INJECTION, SOLUTION INTRAMUSCULAR; INTRAVENOUS; SUBCUTANEOUS
Status: DISCONTINUED | OUTPATIENT
Start: 2024-10-21 | End: 2024-10-21 | Stop reason: SDUPTHER

## 2024-10-21 RX ORDER — FENTANYL CITRATE 50 UG/ML
INJECTION, SOLUTION INTRAMUSCULAR; INTRAVENOUS
Status: DISCONTINUED | OUTPATIENT
Start: 2024-10-21 | End: 2024-10-21 | Stop reason: SDUPTHER

## 2024-10-21 RX ORDER — SODIUM CHLORIDE 9 MG/ML
INJECTION, SOLUTION INTRAVENOUS
Status: DISCONTINUED | OUTPATIENT
Start: 2024-10-21 | End: 2024-10-21 | Stop reason: SDUPTHER

## 2024-10-21 RX ORDER — DEXAMETHASONE SODIUM PHOSPHATE 10 MG/ML
10 INJECTION, SOLUTION INTRAMUSCULAR; INTRAVENOUS ONCE
Status: COMPLETED | OUTPATIENT
Start: 2024-10-21 | End: 2024-10-21

## 2024-10-21 RX ORDER — ONDANSETRON 2 MG/ML
INJECTION INTRAMUSCULAR; INTRAVENOUS
Status: DISCONTINUED | OUTPATIENT
Start: 2024-10-21 | End: 2024-10-21 | Stop reason: SDUPTHER

## 2024-10-21 RX ORDER — ONDANSETRON 2 MG/ML
4 INJECTION INTRAMUSCULAR; INTRAVENOUS
Status: DISCONTINUED | OUTPATIENT
Start: 2024-10-21 | End: 2024-10-21 | Stop reason: HOSPADM

## 2024-10-21 RX ORDER — OXYCODONE HYDROCHLORIDE 5 MG/1
5 TABLET ORAL PRN
Status: DISCONTINUED | OUTPATIENT
Start: 2024-10-21 | End: 2024-10-21 | Stop reason: HOSPADM

## 2024-10-21 RX ORDER — OXYCODONE AND ACETAMINOPHEN 5; 325 MG/1; MG/1
1 TABLET ORAL EVERY 6 HOURS PRN
Qty: 28 TABLET | Refills: 0 | Status: SHIPPED | OUTPATIENT
Start: 2024-10-21 | End: 2024-10-28

## 2024-10-21 RX ORDER — SODIUM CHLORIDE 0.9 % (FLUSH) 0.9 %
5-40 SYRINGE (ML) INJECTION PRN
Status: DISCONTINUED | OUTPATIENT
Start: 2024-10-21 | End: 2024-10-21 | Stop reason: HOSPADM

## 2024-10-21 RX ORDER — PHENYLEPHRINE HYDROCHLORIDE 10 MG/ML
INJECTION INTRAVENOUS
Status: DISCONTINUED | OUTPATIENT
Start: 2024-10-21 | End: 2024-10-21 | Stop reason: SDUPTHER

## 2024-10-21 RX ORDER — POLYETHYLENE GLYCOL 3350 17 G/17G
17 POWDER, FOR SOLUTION ORAL DAILY
Qty: 510 G | Refills: 0 | Status: SHIPPED | OUTPATIENT
Start: 2024-10-21 | End: 2024-11-20

## 2024-10-21 RX ORDER — NALOXONE HYDROCHLORIDE 0.4 MG/ML
INJECTION, SOLUTION INTRAMUSCULAR; INTRAVENOUS; SUBCUTANEOUS PRN
Status: DISCONTINUED | OUTPATIENT
Start: 2024-10-21 | End: 2024-10-21 | Stop reason: HOSPADM

## 2024-10-21 RX ORDER — KETOROLAC TROMETHAMINE 30 MG/ML
30 INJECTION, SOLUTION INTRAMUSCULAR; INTRAVENOUS ONCE
Status: COMPLETED | OUTPATIENT
Start: 2024-10-21 | End: 2024-10-21

## 2024-10-21 RX ORDER — SODIUM CHLORIDE 0.9 % (FLUSH) 0.9 %
5-40 SYRINGE (ML) INJECTION EVERY 12 HOURS SCHEDULED
Status: DISCONTINUED | OUTPATIENT
Start: 2024-10-21 | End: 2024-10-21 | Stop reason: HOSPADM

## 2024-10-21 RX ORDER — LABETALOL HYDROCHLORIDE 5 MG/ML
5 INJECTION, SOLUTION INTRAVENOUS EVERY 10 MIN PRN
Status: DISCONTINUED | OUTPATIENT
Start: 2024-10-21 | End: 2024-10-21 | Stop reason: HOSPADM

## 2024-10-21 RX ORDER — PROPOFOL 10 MG/ML
INJECTION, EMULSION INTRAVENOUS
Status: DISCONTINUED | OUTPATIENT
Start: 2024-10-21 | End: 2024-10-21 | Stop reason: SDUPTHER

## 2024-10-21 RX ORDER — LIDOCAINE HYDROCHLORIDE 20 MG/ML
INJECTION, SOLUTION INFILTRATION; PERINEURAL
Status: DISCONTINUED | OUTPATIENT
Start: 2024-10-21 | End: 2024-10-21 | Stop reason: SDUPTHER

## 2024-10-21 RX ORDER — OXYCODONE HYDROCHLORIDE 5 MG/1
10 TABLET ORAL PRN
Status: DISCONTINUED | OUTPATIENT
Start: 2024-10-21 | End: 2024-10-21 | Stop reason: HOSPADM

## 2024-10-21 RX ORDER — MAGNESIUM HYDROXIDE 1200 MG/15ML
LIQUID ORAL CONTINUOUS PRN
Status: COMPLETED | OUTPATIENT
Start: 2024-10-21 | End: 2024-10-21

## 2024-10-21 RX ORDER — GLYCOPYRROLATE 0.2 MG/ML
INJECTION INTRAMUSCULAR; INTRAVENOUS
Status: DISCONTINUED | OUTPATIENT
Start: 2024-10-21 | End: 2024-10-21 | Stop reason: SDUPTHER

## 2024-10-21 RX ORDER — SODIUM CHLORIDE 9 MG/ML
INJECTION, SOLUTION INTRAVENOUS PRN
Status: DISCONTINUED | OUTPATIENT
Start: 2024-10-21 | End: 2024-10-21 | Stop reason: HOSPADM

## 2024-10-21 RX ORDER — ONDANSETRON 4 MG/1
4 TABLET, FILM COATED ORAL 3 TIMES DAILY PRN
Qty: 15 TABLET | Refills: 0 | Status: SHIPPED | OUTPATIENT
Start: 2024-10-21

## 2024-10-21 RX ORDER — SODIUM CHLORIDE, SODIUM LACTATE, POTASSIUM CHLORIDE, CALCIUM CHLORIDE 600; 310; 30; 20 MG/100ML; MG/100ML; MG/100ML; MG/100ML
INJECTION, SOLUTION INTRAVENOUS CONTINUOUS
Status: DISCONTINUED | OUTPATIENT
Start: 2024-10-21 | End: 2024-10-21 | Stop reason: HOSPADM

## 2024-10-21 RX ORDER — MEPERIDINE HYDROCHLORIDE 25 MG/ML
12.5 INJECTION INTRAMUSCULAR; INTRAVENOUS; SUBCUTANEOUS EVERY 5 MIN PRN
Status: DISCONTINUED | OUTPATIENT
Start: 2024-10-21 | End: 2024-10-21 | Stop reason: HOSPADM

## 2024-10-21 RX ORDER — DIPHENHYDRAMINE HYDROCHLORIDE 50 MG/ML
12.5 INJECTION INTRAMUSCULAR; INTRAVENOUS
Status: DISCONTINUED | OUTPATIENT
Start: 2024-10-21 | End: 2024-10-21 | Stop reason: HOSPADM

## 2024-10-21 RX ORDER — ROCURONIUM BROMIDE 10 MG/ML
INJECTION, SOLUTION INTRAVENOUS
Status: DISCONTINUED | OUTPATIENT
Start: 2024-10-21 | End: 2024-10-21 | Stop reason: SDUPTHER

## 2024-10-21 RX ORDER — VANCOMYCIN HYDROCHLORIDE 1 G/20ML
INJECTION, POWDER, LYOPHILIZED, FOR SOLUTION INTRAVENOUS PRN
Status: DISCONTINUED | OUTPATIENT
Start: 2024-10-21 | End: 2024-10-21 | Stop reason: ALTCHOICE

## 2024-10-21 RX ORDER — PREGABALIN 25 MG/1
75 CAPSULE ORAL ONCE
Status: COMPLETED | OUTPATIENT
Start: 2024-10-21 | End: 2024-10-21

## 2024-10-21 RX ORDER — ACETAMINOPHEN 500 MG
1000 TABLET ORAL ONCE
Status: COMPLETED | OUTPATIENT
Start: 2024-10-21 | End: 2024-10-21

## 2024-10-21 RX ORDER — DEXAMETHASONE SODIUM PHOSPHATE 4 MG/ML
INJECTION, SOLUTION INTRA-ARTICULAR; INTRALESIONAL; INTRAMUSCULAR; INTRAVENOUS; SOFT TISSUE
Status: DISCONTINUED | OUTPATIENT
Start: 2024-10-21 | End: 2024-10-21 | Stop reason: SDUPTHER

## 2024-10-21 RX ADMIN — KETOROLAC TROMETHAMINE 30 MG: 30 INJECTION, SOLUTION INTRAMUSCULAR at 14:54

## 2024-10-21 RX ADMIN — PREGABALIN 75 MG: 25 CAPSULE ORAL at 10:45

## 2024-10-21 RX ADMIN — SUGAMMADEX 200 MG: 100 INJECTION, SOLUTION INTRAVENOUS at 13:53

## 2024-10-21 RX ADMIN — LIDOCAINE HYDROCHLORIDE 100 MG: 20 INJECTION, SOLUTION INFILTRATION; PERINEURAL at 12:15

## 2024-10-21 RX ADMIN — ROCURONIUM BROMIDE 50 MG: 10 INJECTION INTRAVENOUS at 12:15

## 2024-10-21 RX ADMIN — ONDANSETRON 4 MG: 2 INJECTION INTRAMUSCULAR; INTRAVENOUS at 13:30

## 2024-10-21 RX ADMIN — HYDROMORPHONE HYDROCHLORIDE 0.5 MG: 1 INJECTION, SOLUTION INTRAMUSCULAR; INTRAVENOUS; SUBCUTANEOUS at 14:17

## 2024-10-21 RX ADMIN — DEXAMETHASONE SODIUM PHOSPHATE 4 MG: 4 INJECTION INTRA-ARTICULAR; INTRALESIONAL; INTRAMUSCULAR; INTRAVENOUS; SOFT TISSUE at 12:22

## 2024-10-21 RX ADMIN — GLYCOPYRROLATE 0.2 MG: 0.2 INJECTION INTRAMUSCULAR; INTRAVENOUS at 12:15

## 2024-10-21 RX ADMIN — TRANEXAMIC ACID 1000 MG: 100 INJECTION, SOLUTION INTRAVENOUS at 12:20

## 2024-10-21 RX ADMIN — HYDROMORPHONE HYDROCHLORIDE 0.5 MG: 2 INJECTION, SOLUTION INTRAMUSCULAR; INTRAVENOUS; SUBCUTANEOUS at 13:13

## 2024-10-21 RX ADMIN — PROPOFOL 150 MG: 10 INJECTION, EMULSION INTRAVENOUS at 12:15

## 2024-10-21 RX ADMIN — HYDROMORPHONE HYDROCHLORIDE 0.5 MG: 1 INJECTION, SOLUTION INTRAMUSCULAR; INTRAVENOUS; SUBCUTANEOUS at 14:11

## 2024-10-21 RX ADMIN — FAMOTIDINE 20 MG: 10 INJECTION, SOLUTION INTRAVENOUS at 10:53

## 2024-10-21 RX ADMIN — SODIUM CHLORIDE: 0.9 INJECTION, SOLUTION INTRAVENOUS at 12:10

## 2024-10-21 RX ADMIN — DEXAMETHASONE SODIUM PHOSPHATE 10 MG: 10 INJECTION INTRAMUSCULAR; INTRAVENOUS at 10:54

## 2024-10-21 RX ADMIN — ACETAMINOPHEN 1000 MG: 500 TABLET ORAL at 10:45

## 2024-10-21 RX ADMIN — PROPOFOL 50 MG: 10 INJECTION, EMULSION INTRAVENOUS at 13:13

## 2024-10-21 RX ADMIN — FENTANYL CITRATE 50 MCG: 50 INJECTION INTRAMUSCULAR; INTRAVENOUS at 12:38

## 2024-10-21 RX ADMIN — HYDROMORPHONE HYDROCHLORIDE 0.5 MG: 2 INJECTION, SOLUTION INTRAMUSCULAR; INTRAVENOUS; SUBCUTANEOUS at 12:56

## 2024-10-21 RX ADMIN — PHENYLEPHRINE HYDROCHLORIDE 100 MCG: 10 INJECTION INTRAVENOUS at 13:32

## 2024-10-21 RX ADMIN — FENTANYL CITRATE 50 MCG: 50 INJECTION INTRAMUSCULAR; INTRAVENOUS at 12:33

## 2024-10-21 RX ADMIN — PHENYLEPHRINE HYDROCHLORIDE 100 MCG: 10 INJECTION INTRAVENOUS at 13:41

## 2024-10-21 RX ADMIN — SODIUM CHLORIDE 2000 MG: 900 INJECTION INTRAVENOUS at 12:10

## 2024-10-21 RX ADMIN — HYDROMORPHONE HYDROCHLORIDE 0.5 MG: 1 INJECTION, SOLUTION INTRAMUSCULAR; INTRAVENOUS; SUBCUTANEOUS at 14:29

## 2024-10-21 ASSESSMENT — PAIN DESCRIPTION - DESCRIPTORS
DESCRIPTORS: ACHING;SHARP;SORE;DISCOMFORT
DESCRIPTORS: ACHING
DESCRIPTORS: ACHING;SORE;SHARP;DISCOMFORT
DESCRIPTORS: ACHING;SHARP;SORE;DISCOMFORT
DESCRIPTORS: ACHING;SHARP;SORE;DISCOMFORT

## 2024-10-21 ASSESSMENT — PAIN DESCRIPTION - ORIENTATION
ORIENTATION: LEFT

## 2024-10-21 ASSESSMENT — PAIN - FUNCTIONAL ASSESSMENT
PAIN_FUNCTIONAL_ASSESSMENT: 0-10
PAIN_FUNCTIONAL_ASSESSMENT: PREVENTS OR INTERFERES SOME ACTIVE ACTIVITIES AND ADLS

## 2024-10-21 ASSESSMENT — PAIN DESCRIPTION - LOCATION
LOCATION: HIP

## 2024-10-21 ASSESSMENT — PAIN SCALES - GENERAL
PAINLEVEL_OUTOF10: 10
PAINLEVEL_OUTOF10: 8
PAINLEVEL_OUTOF10: 10
PAINLEVEL_OUTOF10: 9

## 2024-10-21 NOTE — PROGRESS NOTES
PT called at this time, patient states he can't do any therapy at this time due to pain. PT will check back.

## 2024-10-21 NOTE — OP NOTE
fracture noted.     The wound was copiously irrigated with normal saline and irrisept. 1 g of vancomycin was inserted into the joint.  The tensor fascia vanessa was then closed with three 0 Ethibond sutures to adrián the interval followed by several interrupted 0 Vicryl sutures followed by a running #1 strata fix. 0 Vicryl and 2-0 Vicryl was then run in a interrupted fashion to close the skin and subcutaneous tissue.  A running 4-0 Monocryl was utilized to close the skin in a subcuticular fashion.  A skin glue mesh dressing was placed on the skin.  A sterile Mepilex was placed.  The patient was then awoken from general anesthesia without complication and transferred back to the PACU in stable condition.      Surgical Assist Requirement:  The assistance of Kee Tobin PA-C for the case was necessary due to several factors. There was no other qualified surgical resident or assistant available to assist with the procedure. Kee's assistance was necessary for exposure, retraction, leg manipulation, placement of implants, irrigation of the wound, wound closure, and other assistant duties that were necessary to complete the procedure.        Postoperative plan:  The patient will be weightbearing as tolerated.  The patient will work with PT in PACU for possible DC home today.  Restart home Eliquis twice a day for blood clot prevention.  Multimodal pain protocol postoperatively.  Follow-up with me in 10 to 14 days.       Electronically signed by Kee Youngblood DO on 10/21/2024 at 1:31 PM

## 2024-10-21 NOTE — BRIEF OP NOTE
Brief Postoperative Note      Patient: Saw Lane  YOB: 1940  MRN: [id number]    Date of Procedure: 10/21/2024    Pre-Op Diagnosis Codes:      * Primary osteoarthritis of left hip [M16.12]    Post-Op Diagnosis: Same       Procedure(s):  LEFT ANTERIOR TOTAL HIP ARTHOPLASTY --BLOCK--    Surgeon(s):  Kee Youngblood DO    Assistant:  Surgical Assistant: Evens Monteiro  Physician Assistant: Kee Tobin PA    Anesthesia: General and regional    Estimated Blood Loss (mL): 200     Complications: None    Specimens:   * No specimens in log *    Implants:  Implant Name Type Inv. Item Serial No.  Lot No. LRB No. Used Action   LINER ACET NEUT G 40 MM VIVACIT-E G7 - [id number]  LINER ACET NEUT G 40 MM VIVACIT-E G7  [employer]- [id number] Left 1 Implanted   G7 OSSEOTI 4 HOLE SHELL 58MM G - [id number]  G7 OSSEOTI 4 HOLE SHELL 58MM G  [employer]- [id number] Left 1 Implanted   STEM FEM STD OFFSET 6 HIP CLLRD HA AVENIR COMPLETE - [id number]  STEM FEM STD OFFSET 6 HIP CLLRD HA AVENIR COMPLETE  [employer]- [id number] Left 1 Implanted   BIOLOX DELTA FEM HEAD 40MM +0MM - [id number]  BIOLOX DELTA FEM HEAD 40MM +0MM  [employer]- [id number] Left 1 Implanted   Implants: Kasie G7 58 acetabulum shell, 40 neutral liner, avenir complete collared 6 stem, 40 + 0 ceramic head        Findings:  Infection Present At Time Of Surgery (PATOS) (choose all levels that have infection present):  No infection present  Other Findings: see op note    Electronically signed by Kee Youngblood DO on 10/21/2024 at 1:29 PM

## 2024-10-21 NOTE — H&P
Orthopedic Preoperative Note      CHIEF COMPLAINT:  L hip pain    HISTORY OF PRESENT ILLNESS:      The patient is a 84 y.o. male with L hip pain due to osteoarthritis.    Past Medical History:    Past Medical History:   Diagnosis Date    A-fib (HCC)     CAD (coronary artery disease)        Past Surgical History:    Past Surgical History:   Procedure Laterality Date    CORONARY ANGIOPLASTY WITH STENT PLACEMENT      ELBOW SURGERY      VASECTOMY         Medications Prior to Admission:   Prior to Admission medications    Medication Sig Start Date End Date Taking? Authorizing Provider   mupirocin (BACTROBAN) 2 % ointment Apply 1/2 inch to each nostril twice daily, starting 5 days prior to surgery. 10/9/24  Yes Kee Youngblood DO   rosuvastatin (CRESTOR) 5 MG tablet Take 1 tablet by mouth daily 8/15/24  Yes Alfonso Grijalva APRN - CNP   tamsulosin (FLOMAX) 0.4 MG capsule Take 1 capsule by mouth daily 8/15/24  Yes Alfonso Grijalva APRN - CNP   sotalol (BETAPACE) 80 MG tablet Take 1 tablet by mouth daily 7/5/24  Yes Nevaeh Mckeon APRN - CNP   gabapentin (NEURONTIN) 300 MG capsule Take 1 capsule by mouth 3 times daily. 3/15/24  Yes Nadiya Kruse MD   cetirizine (ZYRTEC) 5 MG tablet Take 1 tablet by mouth daily   Yes Nadiya Kruse MD   acetaminophen (TYLENOL) 500 MG tablet Take 1 tablet by mouth every 6 hours as needed for Pain   Yes Nadiya Kruse MD   vitamin D (ERGOCALCIFEROL) 1.25 MG (02445 UT) CAPS capsule Take 1 capsule by mouth once a week 10/7/24   Kee Youngblood DO   ELIQUIS 5 MG TABS tablet Take 1 tablet by mouth 2 times daily 5/23/24   Nadiya Kruse MD       Allergies:    Patient has no known allergies.    Social History:   Social History     Socioeconomic History    Marital status:      Spouse name: None    Number of children: None    Years of education: None    Highest education level: None   Tobacco Use    Smoking status: Never    Smokeless tobacco: Never 
27-Mar-2021 10:43

## 2024-10-21 NOTE — ANESTHESIA POSTPROCEDURE EVALUATION
Department of Anesthesiology  Postprocedure Note    Patient: Saw Lane  MRN: [id number]  YOB: 1940  Date of evaluation: 10/21/2024    Procedure Summary       Date: 10/21/24 Room / Location: [address]    Anesthesia Start: 1210 Anesthesia Stop: 1402    Procedure: LEFT ANTERIOR TOTAL HIP ARTHOPLASTY --BLOCK-- (Left: Hip) Diagnosis:       Primary osteoarthritis of left hip      (Primary osteoarthritis of left hip [M16.12])    Surgeons: Kee Youngblood DO Responsible Provider: Jaxson Jaramillo MD    Anesthesia Type: general ASA Status: 3            Anesthesia Type: No value filed.    Madelyn Phase I: Madelyn Score: 10    Madelyn Phase II:      Anesthesia Post Evaluation    Comments: Postoperative Anesthesia Note    Name:    Saw Lane  MRN:      [id number]    Patient Vitals in the past 12 hrs:  10/21/24 1515, BP:(!) 167/64, Pulse:58, Resp:14, SpO2:98 %  10/21/24 1500, BP:(!) 163/78, Temp:96.9 °F (36.1 °C), Temp src:Temporal, Pulse:54, Resp:12, SpO2:97 %  10/21/24 1445, BP:(!) 140/104, Pulse:63, Resp:18, SpO2:97 %  10/21/24 1430, BP:(!) 147/81, Temp:96.8 °F (36 °C), Temp src:Temporal, Pulse:63, Resp:14, SpO2:98 %  10/21/24 1429, Resp:16  10/21/24 1424, BP:(!) 150/75, Temp:96.8 °F (36 °C), Temp src:Temporal, Pulse:66, Resp:16, SpO2:98 %  10/21/24 1417, BP:(!) 148/87, Pulse:68, Resp:23, SpO2:96 %  10/21/24 1415, BP:(!) 143/87, Pulse:69, Resp:21, SpO2:95 %  10/21/24 1411, BP:(!) 148/87, Temp:96.9 °F (36.1 °C), Temp src:Temporal, Pulse:69, Resp:20, SpO2:93 %  10/21/24 1406, BP:(!) 143/65, Pulse:71, Resp:15, SpO2:92 %  10/21/24 1401, BP:(!) 152/61, Temp:96.8 °F (36 °C), Temp src:Temporal, Pulse:71, Resp:12, SpO2:93 %  10/21/24 1041, BP:(!) 177/80, Temp:97.1 °F (36.2 °C), Temp src:Temporal, Pulse:59, Resp:16, SpO2:97 %, Height:1.753 m (5' 9\"), Weight:108.4 kg (239 lb)     LABS:    CBC  Lab Results       Component                Value               Date/Time                  WBC

## 2024-10-21 NOTE — ANESTHESIA PROCEDURE NOTES
nerve appeared to be anatomically normal and there were no abnormal pathologically findings seen.

## 2024-10-21 NOTE — DISCHARGE INSTRUCTIONS
Kee Youngblood, DO           Discharge Instructions - Anterior total hip arthroplasty    Weight bearing status: weightbearing as tolerated for the left leg. Start with walker/crutch assist and progress to cane if needed.  Keep dressing clean and dry.  Starting 3 days after surgery, Ok for daily dressing changes until wound is dry. Then leave open to air.  Dress with plastic bag and rubber band to seal and repeat with second bag and rubber band when showering. \"Press & Seal\" wrap also works for sealing the rubber bag when showering until wound is clean, dry, and no longer draining.  Physical Therapy for rehabilitation. Home PT to start, and we can transition to outpatient therapy if patient requires.  Ice (20 minutes on and off 1 hour) and elevate as needed to reduce swelling and throbbing pain.  Starting 3 days after surgery, if wound is no longer leaking, Ok to shower but no soaks or baths.  Advance diet as tolerated: begin with clear liquids.  Drink plenty of fluids.  Should urinate within 8 hours of surgery.  Call the office or come to Emergency Room if signs of infection appear (hot, swollen, red, draining pus, fever)  Take medications as prescribed. Restart home eliquis tomorrow (10/22/24) to decrease your risk of blood clots.  Wean off narcotics (percocet/norco) as soon as possible. Do not take tylenol if still taking narcotics.  Follow up with Dr. Youngblood  10-14 days after surgery. Call [phone] to schedule.

## 2024-10-21 NOTE — ANESTHESIA PRE PROCEDURE
Department of Anesthesiology  Preprocedure Note       Name:  Saw Lane   Age:  84 y.o.  :  1940                                          MRN:  [id number]         Date:  10/21/2024      Surgeon: Surgeon(s):  Kee oYungblood DO    Procedure: Procedure(s):  LEFT ANTERIOR TOTAL HIP ARTHOPLASTY --BLOCK--    Medications prior to admission:   Prior to Admission medications    Medication Sig Start Date End Date Taking? Authorizing Provider   oxyCODONE-acetaminophen (PERCOCET) 5-325 MG per tablet Take 1 tablet by mouth every 6 hours as needed for Pain for up to 7 days. Intended supply: 7 days. Take lowest dose possible to manage pain Max Daily Amount: 4 tablets 10/21/24 10/28/24 Yes Kee Youngblood DO   polyethylene glycol (GLYCOLAX) 17 GM/SCOOP powder Take 17 g by mouth daily 10/21/24 11/20/24 Yes Kee Youngblood DO   ondansetron (ZOFRAN) 4 MG tablet Take 1 tablet by mouth 3 times daily as needed for Nausea or Vomiting 10/21/24  Yes Kee Youngblood DO   mupirocin (BACTROBAN) 2 % ointment Apply 1/2 inch to each nostril twice daily, starting 5 days prior to surgery. 10/9/24  Yes Kee Youngblood DO   rosuvastatin (CRESTOR) 5 MG tablet Take 1 tablet by mouth daily 8/15/24  Yes Alfonso Grijalva APRN - CNP   tamsulosin (FLOMAX) 0.4 MG capsule Take 1 capsule by mouth daily 8/15/24  Yes Alfonso Grijalva APRN - CNP   sotalol (BETAPACE) 80 MG tablet Take 1 tablet by mouth daily 24  Yes Nevaeh Mckeon APRN - CNP   gabapentin (NEURONTIN) 300 MG capsule Take 1 capsule by mouth 3 times daily. 3/15/24  Yes Nadiya Kruse MD   cetirizine (ZYRTEC) 5 MG tablet Take 1 tablet by mouth daily   Yes Nadiya Kruse MD   acetaminophen (TYLENOL) 500 MG tablet Take 1 tablet by mouth every 6 hours as needed for Pain   Yes Nadiya Kruse MD   vitamin D (ERGOCALCIFEROL) 1.25 MG (33913 UT) CAPS capsule Take 1 capsule by mouth once a week 10/7/24   Kee Youngblood DO ELIQUIS 5 MG TABS tablet Take

## 2024-10-21 NOTE — PROGRESS NOTES
Inpatient Physical Therapy Evaluation, Treatment, & Discharge Summary    Unit: PACU  Date:  10/21/2024  Patient Name:    Saw Lane  Admitting diagnosis:  Primary osteoarthritis of left hip [M16.12]  Admit Date:  10/21/2024  Precautions/Restrictions/WB Status/ Lines/ Wounds/ Oxygen: Fall risk, Bed/chair alarm, and Lines (IV)    Treatment Time:  1526 - 1555  Treatment Number:  1   Timed Code Treatment Minutes: 19 minutes  Total Treatment Minutes:  29  minutes    Patient Stated Goals for Therapy: \" go home \"        Same Day Ortho Eval:    Pt seen for eval POD #0? Yes  Was pt discharged POD #0? Yes          Discharge Recommendations: Home with PRN assistance and Home with 24hr supervision  DME needs for discharge: Needs Met       Therapy recommendation for EMS Transport: can transport by wheelchair    Therapy recommendations for staff:   Supervision for ambulation with use of rolling walker (RW) and gait belt to/from chair  to/from bathroom  within room  within halls    History of Present Illness:   10/21/24 LEFT ANTERIOR TOTAL HIP ARTHOPLASTY     Preadmission Environment:   Pt lives with    with family (son and DIL)  Home environment:    one story home  Steps to enter first floor:   ramp  Steps to second floor/basement: N/A  Laundry:     1st floor  Bathroom:     tub/shower unit, grab bars in shower, and comfort height toilet  Pt sleeps in a:    Flat bed  Equipment owned:    RW, SPC, and reacher    Preadmission Status:  Pt able to drive: Yes  Pt fully independent with ADLs: Yes  Pt receives assistance from family for: Independent PTA  Pt independent for functional transfers and utilized No Device for mobility in home and SPC out in community  History of falls: No  Home Health Services:None    AM-PAC Mobility Score    AM-PAC Inpatient Mobility Raw Score : 18       Subjective  Patient lying reclined in bed with family present (son).  Pt agreeable to this PT session.     Cognition    A&O x4   Able to follow 2 step

## 2024-10-21 NOTE — PROGRESS NOTES
IV x 1 removed per discharge hemostasis achieved, dressing applied, no complications noted. Patient denies further needs. Pt transported to private car via wheel chair. Vitals per doc flow, pt elsi Jennings assumes responsibility.

## 2024-10-21 NOTE — PROGRESS NOTES
Called Dr. Jaramillo for pain medication for patient. He has had Dilaudid 1.5 mg total. Order taken for Toradol 30 mg to be given now x 1 dose.    Read back and order put in.

## 2024-10-21 NOTE — PROGRESS NOTES
Discharge instructions explained in detail at bedside to both pt and pts son Dick. Pt and son received copy of discharge instructions and prescriptions x 3. Pt  and son deny having any questions about discharge.

## 2024-10-22 ENCOUNTER — TELEPHONE (OUTPATIENT)
Dept: ORTHOPEDIC SURGERY | Age: 84
End: 2024-10-22

## 2024-10-22 NOTE — TELEPHONE ENCOUNTER
Spoke with patient.  He is doing well today.    Incision status: No drainage or redness    Edema/Swelling/Teds: We discussed swelling and using ice prn.    Pain level and status: Rates pain at 5/10 today.    Use of pain medications: Percocet 5/325, taking q6h prn    Blood thinner: Eliquis 5 mg BID    Bowels: No BM since surgery.  He did have a bowel movement right before surgery yesterday.  He is taking Glycolax daily and drinking plenty of water.  He is urinating without issues.    Home Care Agency active: No    Outpatient therapy: Hasn't started yet.  He is walking around at home with his walker, and doing some home ROM exercises shown in therapy at the hospital.    Do you have all of your medications: Yes    Changes in medications: No    Instructed pt to call Nurse Navigator or surgeon's office with any questions or concerns.     Follow up appointments:    Future Appointments   Date Time Provider Department Center   11/5/2024  9:15 AM Kee Youngblood DO [facility]

## 2024-10-22 NOTE — DISCHARGE INSTR - OTHER ORDERS
[facility] is participating in Medicare's Comprehensive Care for Joint Replacement (CJR) model    [facility] is participating in a Medicare initiative called the Comprehensive Care for Joint Replacement (CJR) model. Medicare designed this model to encourage higher quality care and greater financial accountability from hospitals when Medicare beneficiaries receive lower-extremity joint replacement procedures (LEJR), typically hip or knee replacements. [facility]’s participation in the CJR model should not restrict your access to care for your medical condition or your freedom to choose your health care providers and services. All existing Medicare beneficiary protections continue to be available to you.     The CJR model aims to help give you better care.     The CJR model aims to support better and more efficient care for beneficiaries undergoing LEJR procedures. A CJR episode of care is typically defined as an admission of an eligible Medicare beneficiary to a hospital participating in the CJR model for an LEJR procedure. The LEJR procedure can take place in either an inpatient or outpatient setting and will still be considered a CJR episode of care. The CJR episode of care continues for 90 days following discharge.     This model uses episode payment and quality measurement for an episode of care associated with LEJR procedures to encourage hospitals, physicians, and post-acute care providers to work together to improve the quality and coordination of care from the initial hospitalization through recovery. Under the CJR model, [facility] can earn additional payments from Medicare if we meet the high quality goals set by Medicare, while keeping hospital costs and care spending under control. If we don’t meet these quality and cost goals, we may have to repay Medicare.     Medicare is using the CJR model to encourage [facility]  GO to the ER for further testing. Follow up with PCP.

## 2024-10-30 ENCOUNTER — TELEPHONE (OUTPATIENT)
Dept: ORTHOPEDIC SURGERY | Age: 84
End: 2024-10-30

## 2024-10-30 NOTE — TELEPHONE ENCOUNTER
Spoke to pt and his son informed them Dr. Youngblood is not in [place] this Friday and pt has an appt Tuesday that will be the earliest we have pt and son verbalized understanding

## 2024-10-30 NOTE — TELEPHONE ENCOUNTER
General Question POST OP PAIN    Subject: LT HIP PAIN SX 10/21  Patient Request: Saw Lane   Contact Number: [phone] /JADEN JACKSON TORREY      PATIENT'S SON IS REQ A RETURN CALL REGARDING PATIENT'S PAIN. STATES HIS DAD IS  HAVING SEVERE PAIN FROM KNEE CAP DOWN TO HIS ANKLE, SOME CALF PAIN. NO FEVER OR WARMTH. DOES HAVE PO APPT 11/5, ASKING TO BE SEEN SOONER IF POSSIBLE.    PLEASE RETURN CALL TO JADEN AT THE ABOVE NUMBER

## 2024-10-30 NOTE — TELEPHONE ENCOUNTER
Spoke to son and informed him Dr. Youngblood was not in today son stated pain was getting better for his father but he is having swelling. I informed him swelling is normal after surgery he just had surgery 10/21/24, but if he develops any redness, streaks/lines, SOB any symptoms like that he is to take him to the ER to be evaluated. Pt son verbalized understanding.

## 2024-10-30 NOTE — TELEPHONE ENCOUNTER
Appointment Request     Patient requesting earlier appointment: Yes  Appointment offered to patient: YES   Patient Contact Number: [phone]     THIS FRIDAY 11-1-2024... SONU IN THE AFTERNOON...

## 2024-11-05 ENCOUNTER — OFFICE VISIT (OUTPATIENT)
Dept: ORTHOPEDIC SURGERY | Age: 84
End: 2024-11-05

## 2024-11-05 VITALS — HEIGHT: 69 IN | BODY MASS INDEX: 35.4 KG/M2 | WEIGHT: 239 LBS

## 2024-11-05 DIAGNOSIS — Z96.642 S/P TOTAL LEFT HIP ARTHROPLASTY: Primary | ICD-10-CM

## 2024-11-05 PROCEDURE — 99024 POSTOP FOLLOW-UP VISIT: CPT | Performed by: STUDENT IN AN ORGANIZED HEALTH CARE EDUCATION/TRAINING PROGRAM

## 2024-11-05 RX ORDER — OXYCODONE AND ACETAMINOPHEN 5; 325 MG/1; MG/1
1 TABLET ORAL EVERY 6 HOURS PRN
Qty: 28 TABLET | Refills: 0 | Status: SHIPPED | OUTPATIENT
Start: 2024-11-05 | End: 2024-11-12

## 2024-11-07 DIAGNOSIS — R35.1 BENIGN PROSTATIC HYPERPLASIA WITH NOCTURIA: ICD-10-CM

## 2024-11-07 DIAGNOSIS — N40.1 BENIGN PROSTATIC HYPERPLASIA WITH NOCTURIA: ICD-10-CM

## 2024-11-07 DIAGNOSIS — E78.2 MIXED HYPERLIPIDEMIA: ICD-10-CM

## 2024-11-07 RX ORDER — TAMSULOSIN HYDROCHLORIDE 0.4 MG/1
0.4 CAPSULE ORAL DAILY
Qty: 90 CAPSULE | Refills: 1 | Status: SHIPPED | OUTPATIENT
Start: 2024-11-07

## 2024-11-07 RX ORDER — ROSUVASTATIN CALCIUM 5 MG/1
5 TABLET, COATED ORAL DAILY
Qty: 90 TABLET | Refills: 1 | Status: SHIPPED | OUTPATIENT
Start: 2024-11-07

## 2024-11-07 NOTE — TELEPHONE ENCOUNTER
[facility] pharmacy called the office in regards to another pt and states they have been having phone troubles. Pharmacy states they have received the Rxs and will have them ready for pickup after 430pm today. I called pts son Dick [phone] (Home Phone) who verbalized understanding

## 2024-11-07 NOTE — TELEPHONE ENCOUNTER
P [phone] attempted to call pharmacy several times and every time the recording says they are transferring me to the pharmacy, the line gets disconnected.

## 2024-11-07 NOTE — TELEPHONE ENCOUNTER
Please let patient/ son Robb Lane that I refilled his medication for him.  Please call [facility] to make sure it went through per son request before calling him.  Thanks.

## 2024-11-08 ENCOUNTER — TELEPHONE (OUTPATIENT)
Dept: ORTHOPEDIC SURGERY | Age: 84
End: 2024-11-08

## 2024-11-13 ENCOUNTER — HOSPITAL ENCOUNTER (OUTPATIENT)
Dept: PHYSICAL THERAPY | Age: 84
Setting detail: THERAPIES SERIES
Discharge: HOME | End: 2024-11-13
Payer: MEDICARE

## 2024-11-13 DIAGNOSIS — R26.89 ANTALGIC GAIT: ICD-10-CM

## 2024-11-13 DIAGNOSIS — G89.29 CHRONIC LEFT HIP PAIN: Primary | ICD-10-CM

## 2024-11-13 DIAGNOSIS — M25.552 CHRONIC LEFT HIP PAIN: Primary | ICD-10-CM

## 2024-11-13 PROCEDURE — 97110 THERAPEUTIC EXERCISES: CPT

## 2024-11-13 PROCEDURE — 97161 PT EVAL LOW COMPLEX 20 MIN: CPT

## 2024-11-13 PROCEDURE — 97140 MANUAL THERAPY 1/> REGIONS: CPT

## 2024-11-13 NOTE — PLAN OF CARE
[facility]- Outpatient Rehabilitation and Therapy [address] [place] [address] office: [phone] fax: [phone]     Physical Therapy Initial Evaluation Certification      Dear Kee Youngblood DO ,    We had the pleasure of evaluating the following patient for physical therapy services at [facility].  A summary of our findings can be found in the initial assessment below.  This includes our plan of care.  If you have any questions or concerns regarding these findings, please do not hesitate to contact me at the office phone number listed above.  Thank you for the referral.     Physician Signature:_______________________________Date:__________________  By signing above (or electronic signature), therapist’s plan is approved by physician       Physical Therapy: TREATMENT/PROGRESS NOTE   Patient: Saw Lane (84 y.o. male)   Examination Date: 2024   :  1940 MRN: [id number]   Visit #: 1   Insurance Allowable Auth Needed   Auth After evaluation thru [facility]  [x]Yes    []No    Insurance: Payor: [employer] MEDICARE / Plan: ANTHEM MEDIBLUE ESSENTIAL/PLUS / Product Type: *No Product type* /   Insurance ID: [id number] - (Medicare Managed)  Secondary Insurance (if applicable):    Treatment Diagnosis:     ICD-10-CM    1. Chronic left hip pain  M25.552     G89.29       2. 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

## 2024-11-19 ENCOUNTER — HOSPITAL ENCOUNTER (OUTPATIENT)
Dept: PHYSICAL THERAPY | Age: 84
Setting detail: THERAPIES SERIES
Discharge: HOME | End: 2024-11-19
Payer: MEDICARE

## 2024-11-19 PROCEDURE — 97140 MANUAL THERAPY 1/> REGIONS: CPT

## 2024-11-19 PROCEDURE — 97110 THERAPEUTIC EXERCISES: CPT

## 2024-11-19 PROCEDURE — 97112 NEUROMUSCULAR REEDUCATION: CPT

## 2024-11-19 NOTE — FLOWSHEET NOTE
NA    Prognosis for POC: [x] Good [] Fair  [] Poor    Patient requires continued skilled intervention: [x] Yes  [] No      CHARGE CAPTURE     PT CHARGE GRID   CPT Code (TIMED) minutes # CPT Code (UNTIMED) #     Therex (16226)  30' 2  EVAL:LOW (01528 - Typically 20 minutes face-to-face)     Neuromusc. Re-ed (04087) 15' 1  Re-Eval (67616)     Manual (17868) 10' 1  Estim Unattended (40587)     Ther. Act (93583)    Mech. Traction (33780)     Gait (95300)    Dry Needle 1-2 muscle (67203)     Aquatic Therex (34817)    Dry Needle 3+ muscle (46943)     Iontophoresis (56432)    VASO (38684)     Ultrasound (56252)    Group Therapy (30753)     Estim Attended (19071)    Canalith Repositioning (46590)     Physical Performance Test (36910)    Custom orthotic ()     Other:    Other:    Total Timed Code Tx Minutes 55' 4       Total Treatment Minutes 55'        Charge Justification:  (14951) THERAPEUTIC EXERCISE - Provided verbal/tactile cueing for activities related to strengthening, flexibility, endurance, ROM performed to prevent loss of range of motion, maintain or improve muscular strength or increase flexibility, following either an injury or surgery.   (92969) MANUAL THERAPY -  Manual therapy techniques, 1 or more regions, each 15 minutes (Mobilization/manipulation, manual lymphatic drainage, manual traction) for the purpose of modulating pain, promoting relaxation,  increasing ROM, reducing/eliminating soft tissue swelling/inflammation/restriction, improving soft tissue extensibility and allowing for proper ROM for normal function with self care, mobility, lifting and ambulation    GOALS     Patient stated goal: To be able to return to [place] yard without risk for falls  [] Progressing: [] Met: [] Not Met: [] Adjusted    Therapist goals for Patient:   Short Term Goals: To be achieved in: 2 weeks  1. Independent in HEP and progression per patient tolerance, in order to prevent re-injury.   [] Progressing: [] Met: [] Not

## 2024-11-25 ENCOUNTER — HOSPITAL ENCOUNTER (OUTPATIENT)
Dept: PHYSICAL THERAPY | Age: 84
Setting detail: THERAPIES SERIES
Discharge: HOME | End: 2024-11-25
Payer: MEDICARE

## 2024-11-25 PROCEDURE — 97110 THERAPEUTIC EXERCISES: CPT

## 2024-11-25 PROCEDURE — 97140 MANUAL THERAPY 1/> REGIONS: CPT

## 2024-11-25 PROCEDURE — 97112 NEUROMUSCULAR REEDUCATION: CPT

## 2024-11-25 NOTE — FLOWSHEET NOTE
progressing as expected and requires additional follow up with physician  [] Other:     TREATMENT PLAN     Frequency/Duration: 1-2x/week for  12  weeks for the following treatment interventions:    Interventions:  Therapeutic Exercise (71664) including: strength training, ROM, and functional mobility  Therapeutic Activities (27107) including: functional mobility training and education.  Neuromuscular Re-education (47026) activation and proprioception, including postural re-education.    Gait Training (89495) for normalization of ambulation patterns and AD training.   Manual Therapy (78372) as indicated to include: Passive Range of Motion, Gr I-IV mobilizations, Soft Tissue Mobilization, Trigger Point Release, and Myofascial Release  Modalities as needed that may include: Cryotherapy, Electrical Stimulation, Thermal Agents, and Vasoneumatic Compression  Patient education on joint protection, postural re-education, activity modification, and progression of HEP    Plan: Cont POC- Continue emphasis/focus on exercise progression, improving proper muscle recruitment and activation/motor control patterns, increasing ROM, reduce/eliminate soft tissue swelling/inflammation/restriction, improving soft tissue extensibility, and improving postural awareness. Next visit plan to progress reps, add new exercises, and continue current phase     Electronically Signed by Altagracia Ellis, PT  Date: 11/25/2024     Note: Portions of this note have been templated and/or copied from initial evaluation, reassessments and prior notes for documentation efficiency.    Note: If patient does not return for scheduled/recommended follow up visits, this note will serve as a discharge from care along with the most recent update on progress.    Ortho Evaluation

## 2024-12-03 ENCOUNTER — HOSPITAL ENCOUNTER (OUTPATIENT)
Dept: PHYSICAL THERAPY | Age: 84
Setting detail: THERAPIES SERIES
End: 2024-12-03

## 2024-12-10 ENCOUNTER — HOSPITAL ENCOUNTER (OUTPATIENT)
Dept: PHYSICAL THERAPY | Age: 84
Setting detail: THERAPIES SERIES
Discharge: HOME | End: 2024-12-10

## 2024-12-10 PROCEDURE — 97112 NEUROMUSCULAR REEDUCATION: CPT

## 2024-12-10 PROCEDURE — 97140 MANUAL THERAPY 1/> REGIONS: CPT

## 2024-12-10 PROCEDURE — 97110 THERAPEUTIC EXERCISES: CPT

## 2024-12-10 NOTE — FLOWSHEET NOTE
[facility]- Outpatient Rehabilitation and Therapy [address], [place] [address] office: [phone] fax: [phone]         Physical Therapy: TREATMENT/PROGRESS NOTE   Patient: Saw Lane (84 y.o. male)   Examination Date: 12/10/2024   :  1940 MRN: [id number]   Visit #: 4   Insurance Allowable Auth Needed   12  24-2/10/2024 [x]Yes    []No    Insurance: Payor: /   Insurance ID: [id number] - (Medicare Managed)  Secondary Insurance (if applicable):    Treatment Diagnosis:     ICD-10-CM    1. Chronic left hip pain  M25.552     G89.29       2. 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] 10/21/2024   Referring Physician: Kee Youngblood DO  PCP: Alfonso Grijalva, LEONA - CNP     Plan of care signed (Y/N):     Date of Patient follow up with Physician:      Plan of Care Report: NO  POC update due: (10 visits /OR AUTH LIMITS, whichever is less)  2024                                             Medical History:  Comorbidities:  Osteoarthritis  Other: Hx of stent placement  Relevant Medical History:   Past Medical History:   Diagnosis Date    A-fib (HCC)     CAD (coronary artery disease)                                               Precautions/ Contra-indications:           Latex allergy:  NO  Pacemaker:    NO  Contraindications for Manipulation: recent surgical history (relative)  Date of Surgery: 10/21/2024  Other:    Red Flags:  None    Suicide Screening:   The patient did not verbalize a primary behavioral concern, suicidal ideation, suicidal intent, or demonstrate suicidal behaviors.    Preferred Language for Healthcare:   [x] English       [] other:    SUBJECTIVE EXAMINATION     Patient stated complaint: Pt states he is doing well, hip has been feeling better. Pt states he has been compliant with HEP.      Test used Initial score  11/13/24 12/10/2024   Pain Summary VAS 2/10 current    7/10 at worst since

## 2024-12-13 ENCOUNTER — OFFICE VISIT (OUTPATIENT)
Dept: ORTHOPEDIC SURGERY | Age: 84
End: 2024-12-13

## 2024-12-13 VITALS — BODY MASS INDEX: 33.62 KG/M2 | WEIGHT: 227 LBS | HEIGHT: 69 IN

## 2024-12-13 DIAGNOSIS — Z96.642 S/P TOTAL LEFT HIP ARTHROPLASTY: Primary | ICD-10-CM

## 2024-12-13 PROCEDURE — 99024 POSTOP FOLLOW-UP VISIT: CPT | Performed by: STUDENT IN AN ORGANIZED HEALTH CARE EDUCATION/TRAINING PROGRAM

## 2024-12-13 NOTE — PROGRESS NOTES
Chief Complaint  Hip Pain (CK LT. HIP)      History of Present Illness:  The patient is here for repeat evaluation regarding his left total hip arthroplasty.  Overall he is doing well.  He has a little bit of tenderness over the incision.    Prior HPI 11/5/2024:  Saw Lane is a pleasant 84 y.o. male here today for follow up regarding their hip replacement. He is 2 weeks s/p L SAMUEL on 10/21/24. Patient is doing well and denies complications.         Medical History:  Patient's medications, allergies, past medical, surgical, social and family histories were reviewed and updated as appropriate.    Pertinent items are noted in HPI  Review of systems reviewed from Patient History Form dated on 12/13/24 and available in the patient's chart under the Media tab.       Vital Signs:  There were no vitals filed for this visit.      Constitutional: In no apparent distress. Normal affect. Alert and oriented X3 and is cooperative.       L Hip Examination:    Well healed surgical incision, no signs of infection or drainage. EHL/FHL/TA/GS complex motor intact. Sural, saphenous, superificial/deep peroneal, and plantar nerve distribution sensation grossly intact. Dorsalis pedis/posterior tibial pulses 2+ with BCR.  5 out of 5 hip flexion strength noted.      Radiology:       2 views of the L hip demonstrate a stable total hip arthroplasty without complication.           Assessment :  84 y.o. male s/p L hip SAMUEL 10/21/24, 2 months out    Impression:  Encounter Diagnosis   Name Primary?    S/P total left hip arthroplasty Yes         Office Procedures:  No orders of the defined types were placed in this encounter.        Plan:     The patient is doing well.  Finishes formal physical therapy in the next 2 weeks.  Transition to home program.  Follow-up in 4 months for 6-month x-rays.    . Saw Lane is in agreement with this plan. All questions were answered to patient's satisfaction and was encouraged to call with any further

## 2024-12-16 ENCOUNTER — PATIENT MESSAGE (OUTPATIENT)
Dept: FAMILY MEDICINE CLINIC | Age: 84
End: 2024-12-16

## 2024-12-16 DIAGNOSIS — H61.20 IMPACTED CERUMEN, UNSPECIFIED LATERALITY: ICD-10-CM

## 2024-12-16 DIAGNOSIS — H92.09 OTALGIA, UNSPECIFIED LATERALITY: Primary | ICD-10-CM

## 2024-12-16 NOTE — TELEPHONE ENCOUNTER
Patient called back and said he was having ear pain as well, spoke with PCP recommended referral to ENT.  Patient notified and referral placed

## 2024-12-17 ENCOUNTER — HOSPITAL ENCOUNTER (OUTPATIENT)
Dept: PHYSICAL THERAPY | Age: 84
Setting detail: THERAPIES SERIES
Discharge: HOME | End: 2024-12-17

## 2024-12-17 PROCEDURE — 97112 NEUROMUSCULAR REEDUCATION: CPT

## 2024-12-17 PROCEDURE — 97140 MANUAL THERAPY 1/> REGIONS: CPT

## 2024-12-17 PROCEDURE — 97110 THERAPEUTIC EXERCISES: CPT

## 2024-12-17 NOTE — DISCHARGE SUMMARY
[facility]- Outpatient Rehabilitation and Therapy [address] [place] [address] office: [phone] fax: [phone]      Physical Therapy Discharge Summary    Dear Kee Youngblood DO   ,    We had the pleasure of treating the following patient for physical therapy services at [facility].  A summary of our findings can be found in the discharge summary below.  If you have any questions or concerns regarding these findings, please do not hesitate to contact me at the office phone number checked above.  Thank you for the referral.         Total Visits: 5     Recommendation:    [] Continue PT x / wk for  weeks.   [] Hold PT, pending MD visit   [x] Discharge to [facility]. Follow up with PT or MD PRN.     Reason for Discharge:  Patient should continue to improve in reasonable time if they continue HEP           Physical Therapy: TREATMENT/PROGRESS NOTE   Patient: Saw Lane (84 y.o. male)   Examination Date: 2024   :  1940 MRN: [id number]   Visit #: 5   Insurance Allowable Auth Needed   12  24-2/10/2024 [x]Yes    []No    Insurance: Payor: /   Insurance ID: [id number] - (Medicare Managed)  Secondary Insurance (if applicable):    Treatment Diagnosis:     ICD-10-CM    1. Chronic left hip pain  M25.552     G89.29       2. 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

## 2024-12-23 ENCOUNTER — APPOINTMENT (OUTPATIENT)
Dept: PHYSICAL THERAPY | Age: 84
End: 2024-12-23

## 2024-12-23 NOTE — PROGRESS NOTES
[facility]  DIVISION OF OTOLARYNGOLOGY- HEAD & NECK SURGERY  CONSULT    Patient Name: Saw Lane  Medical Record Number:  [id number]  Primary Care Physician:  Alfonso Grijalva APRN - CNP  Date of Consultation: 12/31/2024    Chief Complaint:   Chief Complaint   Patient presents with    New Patient    Ear Problem     Left ear pain thinks full of wax.  Does use hearing aids    Cerumen Impaction      HISTORY OF PRESENT ILLNESS  Saw is a(n) 84 y.o. male who presents for evaluation of hearing loss and possible wax impaction.  He states that last week he had ear pain on the left side which went down into his neck.  It has slowly gotten better.  He does use hearing aids on a daily basis but states the left ear has not been hearing well.  Patient Active Problem List   Diagnosis    Coronary artery disease involving native coronary artery of native heart without angina pectoris    Mixed hyperlipidemia    Primary hypertension    Benign prostatic hyperplasia with nocturia    Environmental allergies    Class 1 obesity without serious comorbidity with body mass index (BMI) of 33.0 to 33.9 in adult    Bilateral chronic knee pain    Primary osteoarthritis of left hip    Chronic left hip pain    Paroxysmal atrial fibrillation (HCC)     Past Surgical History:   Procedure Laterality Date    CORONARY ANGIOPLASTY WITH STENT PLACEMENT      ELBOW SURGERY      OTHER SURGICAL HISTORY  10/21/2024    LEFT ANTERIOR TOTAL HIP ARTHOPLASTY --BLOCK-- - Left    TOTAL HIP ARTHROPLASTY Left 10/21/2024    LEFT ANTERIOR TOTAL HIP ARTHOPLASTY --BLOCK-- performed by Kee Youngblood DO at [facility] OR    VASECTOMY       No family history on file.  Social History     Socioeconomic History    Marital status:      Spouse name: Not on file    Number of children: Not on file    Years of education: Not on file    Highest education level: Not on file   Occupational History    Not on file   Tobacco Use    Smoking status: Never    Smokeless tobacco:

## 2024-12-25 DIAGNOSIS — R35.1 BENIGN PROSTATIC HYPERPLASIA WITH NOCTURIA: ICD-10-CM

## 2024-12-25 DIAGNOSIS — E78.2 MIXED HYPERLIPIDEMIA: ICD-10-CM

## 2024-12-25 DIAGNOSIS — N40.1 BENIGN PROSTATIC HYPERPLASIA WITH NOCTURIA: ICD-10-CM

## 2024-12-26 RX ORDER — TAMSULOSIN HYDROCHLORIDE 0.4 MG/1
0.4 CAPSULE ORAL DAILY
Qty: 90 CAPSULE | Refills: 1 | OUTPATIENT
Start: 2024-12-26

## 2024-12-26 RX ORDER — ROSUVASTATIN CALCIUM 5 MG/1
5 TABLET, COATED ORAL DAILY
Qty: 90 TABLET | Refills: 1 | OUTPATIENT
Start: 2024-12-26

## 2024-12-31 ENCOUNTER — OFFICE VISIT (OUTPATIENT)
Dept: ENT CLINIC | Age: 84
End: 2024-12-31
Payer: MEDICARE

## 2024-12-31 VITALS
BODY MASS INDEX: 33.62 KG/M2 | HEIGHT: 69 IN | DIASTOLIC BLOOD PRESSURE: 96 MMHG | HEART RATE: 86 BPM | WEIGHT: 227 LBS | SYSTOLIC BLOOD PRESSURE: 156 MMHG

## 2024-12-31 DIAGNOSIS — H90.3 SENSORINEURAL HEARING LOSS (SNHL) OF BOTH EARS: ICD-10-CM

## 2024-12-31 DIAGNOSIS — H61.21 IMPACTED CERUMEN OF RIGHT EAR: ICD-10-CM

## 2024-12-31 DIAGNOSIS — T16.2XXA FOREIGN BODY OF LEFT EAR, INITIAL ENCOUNTER: Primary | ICD-10-CM

## 2024-12-31 PROCEDURE — 3080F DIAST BP >= 90 MM HG: CPT | Performed by: STUDENT IN AN ORGANIZED HEALTH CARE EDUCATION/TRAINING PROGRAM

## 2024-12-31 PROCEDURE — 69200 CLEAR OUTER EAR CANAL: CPT | Performed by: STUDENT IN AN ORGANIZED HEALTH CARE EDUCATION/TRAINING PROGRAM

## 2024-12-31 PROCEDURE — 3077F SYST BP >= 140 MM HG: CPT | Performed by: STUDENT IN AN ORGANIZED HEALTH CARE EDUCATION/TRAINING PROGRAM

## 2024-12-31 PROCEDURE — 1123F ACP DISCUSS/DSCN MKR DOCD: CPT | Performed by: STUDENT IN AN ORGANIZED HEALTH CARE EDUCATION/TRAINING PROGRAM

## 2024-12-31 PROCEDURE — 99203 OFFICE O/P NEW LOW 30 MIN: CPT | Performed by: STUDENT IN AN ORGANIZED HEALTH CARE EDUCATION/TRAINING PROGRAM

## 2024-12-31 PROCEDURE — 1160F RVW MEDS BY RX/DR IN RCRD: CPT | Performed by: STUDENT IN AN ORGANIZED HEALTH CARE EDUCATION/TRAINING PROGRAM

## 2024-12-31 PROCEDURE — 69210 REMOVE IMPACTED EAR WAX UNI: CPT | Performed by: STUDENT IN AN ORGANIZED HEALTH CARE EDUCATION/TRAINING PROGRAM

## 2024-12-31 PROCEDURE — 1159F MED LIST DOCD IN RCRD: CPT | Performed by: STUDENT IN AN ORGANIZED HEALTH CARE EDUCATION/TRAINING PROGRAM

## 2024-12-31 ASSESSMENT — ENCOUNTER SYMPTOMS
COUGH: 0
SHORTNESS OF BREATH: 0
RHINORRHEA: 0
VOMITING: 0
EYE PAIN: 0
NAUSEA: 0

## 2025-01-02 ENCOUNTER — OFFICE VISIT (OUTPATIENT)
Dept: FAMILY MEDICINE CLINIC | Age: 85
End: 2025-01-02

## 2025-01-02 VITALS — SYSTOLIC BLOOD PRESSURE: 112 MMHG | WEIGHT: 231 LBS | DIASTOLIC BLOOD PRESSURE: 70 MMHG | BODY MASS INDEX: 34.11 KG/M2

## 2025-01-02 DIAGNOSIS — E78.2 MIXED HYPERLIPIDEMIA: ICD-10-CM

## 2025-01-02 DIAGNOSIS — Z79.899 ENCOUNTER FOR MEDICATION REVIEW: Primary | ICD-10-CM

## 2025-01-02 DIAGNOSIS — I10 PRIMARY HYPERTENSION: ICD-10-CM

## 2025-01-02 DIAGNOSIS — M16.12 PRIMARY OSTEOARTHRITIS OF LEFT HIP: ICD-10-CM

## 2025-01-02 DIAGNOSIS — I48.0 PAROXYSMAL ATRIAL FIBRILLATION (HCC): ICD-10-CM

## 2025-01-02 DIAGNOSIS — G25.81 RESTLESS LEG: ICD-10-CM

## 2025-01-02 RX ORDER — ROPINIROLE 0.5 MG/1
0.5 TABLET, FILM COATED ORAL NIGHTLY PRN
Qty: 30 TABLET | Refills: 0 | Status: SHIPPED | OUTPATIENT
Start: 2025-01-02

## 2025-01-02 ASSESSMENT — PATIENT HEALTH QUESTIONNAIRE - PHQ9
SUM OF ALL RESPONSES TO PHQ9 QUESTIONS 1 & 2: 0
4. FEELING TIRED OR HAVING LITTLE ENERGY: NOT AT ALL
SUM OF ALL RESPONSES TO PHQ QUESTIONS 1-9: 0
3. TROUBLE FALLING OR STAYING ASLEEP: NOT AT ALL
7. TROUBLE CONCENTRATING ON THINGS, SUCH AS READING THE NEWSPAPER OR WATCHING TELEVISION: NOT AT ALL
6. FEELING BAD ABOUT YOURSELF - OR THAT YOU ARE A FAILURE OR HAVE LET YOURSELF OR YOUR FAMILY DOWN: NOT AT ALL
10. IF YOU CHECKED OFF ANY PROBLEMS, HOW DIFFICULT HAVE THESE PROBLEMS MADE IT FOR YOU TO DO YOUR WORK, TAKE CARE OF THINGS AT HOME, OR GET ALONG WITH OTHER PEOPLE: NOT DIFFICULT AT ALL
9. THOUGHTS THAT YOU WOULD BE BETTER OFF DEAD, OR OF HURTING YOURSELF: NOT AT ALL
SUM OF ALL RESPONSES TO PHQ QUESTIONS 1-9: 0
8. MOVING OR SPEAKING SO SLOWLY THAT OTHER PEOPLE COULD HAVE NOTICED. OR THE OPPOSITE, BEING SO FIGETY OR RESTLESS THAT YOU HAVE BEEN MOVING AROUND A LOT MORE THAN USUAL: NOT AT ALL
SUM OF ALL RESPONSES TO PHQ QUESTIONS 1-9: 0
SUM OF ALL RESPONSES TO PHQ QUESTIONS 1-9: 0
2. FEELING DOWN, DEPRESSED OR HOPELESS: NOT AT ALL
1. LITTLE INTEREST OR PLEASURE IN DOING THINGS: NOT AT ALL
5. POOR APPETITE OR OVEREATING: NOT AT ALL

## 2025-01-02 NOTE — PROGRESS NOTES
[facility]  2025    Saw Lane (:  1940) is a 84 y.o. male, here for evaluation of the following medical concerns:    Chief Complaint   Patient presents with    Hypertension        ASSESSMENT/ PLAN  1. Encounter for medication review  -Medications reviewed and he is supposed to be taking Eliquis as directed    2. Primary osteoarthritis of left hip  -Resolved after left total hip surgery    3. Paroxysmal atrial fibrillation (HCC)  -Continue Eliquis.  Continue sotalol.    4. Primary hypertension  -Stable on sotalol.  Continue    5. Mixed hyperlipidemia  -Overall stable on rosuvastatin.  Continue    6. Restless leg  -Trial of Requip nightly as needed  - rOPINIRole (REQUIP) 0.5 MG tablet; Take 1 tablet by mouth nightly as needed (for restless leg)  Dispense: 30 tablet; Refill: 0         No follow-ups on file.    HPI  Patient seen in office today for follow-up regarding medication review.  Recently had left hip surgery and reports that his hip pain has pretty well resolved.  Reports numbness at the incision site.  Today main question is is whether he should be taking Eliquis or not.  I did review with his cardiologist in the past and reports that he did have paroxysmal atrial fibrillation and is supposed to be on Eliquis.    Other complaint today of restless leg mainly at nighttime.  He reports that he wakes up in the melanite with burning in bilateral lower extremities.    ROS  Review of Systems    HISTORIES  Current Outpatient Medications on File Prior to Visit   Medication Sig Dispense Refill    tamsulosin (FLOMAX) 0.4 MG capsule Take 1 capsule by mouth daily 90 capsule 1    rosuvastatin (CRESTOR) 5 MG tablet Take 1 tablet by mouth daily 90 tablet 1    ELIQUIS 5 MG TABS tablet Take 1 tablet by mouth 2 times daily      ondansetron (ZOFRAN) 4 MG tablet Take 1 tablet by mouth 3 times daily as needed for Nausea or Vomiting 15 tablet 0    mupirocin (BACTROBAN) 2 % ointment Apply 1/2 inch to

## 2025-02-20 ENCOUNTER — HOSPITAL ENCOUNTER (EMERGENCY)
Age: 85
Discharge: HOME | End: 2025-02-20
Attending: STUDENT IN AN ORGANIZED HEALTH CARE EDUCATION/TRAINING PROGRAM
Payer: MEDICARE

## 2025-02-20 ENCOUNTER — APPOINTMENT (OUTPATIENT)
Dept: GENERAL RADIOLOGY | Age: 85
End: 2025-02-20
Payer: MEDICARE

## 2025-02-20 ENCOUNTER — OFFICE VISIT (OUTPATIENT)
Dept: FAMILY MEDICINE CLINIC | Age: 85
End: 2025-02-20

## 2025-02-20 VITALS
SYSTOLIC BLOOD PRESSURE: 161 MMHG | RESPIRATION RATE: 16 BRPM | HEIGHT: 69 IN | DIASTOLIC BLOOD PRESSURE: 66 MMHG | OXYGEN SATURATION: 96 % | HEART RATE: 71 BPM | TEMPERATURE: 98.1 F | WEIGHT: 236 LBS | BODY MASS INDEX: 34.96 KG/M2

## 2025-02-20 VITALS — RESPIRATION RATE: 24 BRPM | SYSTOLIC BLOOD PRESSURE: 137 MMHG | HEART RATE: 80 BPM | DIASTOLIC BLOOD PRESSURE: 68 MMHG

## 2025-02-20 DIAGNOSIS — R07.1 CHEST PAIN ON BREATHING: ICD-10-CM

## 2025-02-20 DIAGNOSIS — R07.9 LEFT-SIDED CHEST PAIN: Primary | ICD-10-CM

## 2025-02-20 DIAGNOSIS — I25.10 CORONARY ARTERY DISEASE INVOLVING NATIVE CORONARY ARTERY OF NATIVE HEART WITHOUT ANGINA PECTORIS: ICD-10-CM

## 2025-02-20 DIAGNOSIS — R07.9 CHEST PAIN, UNSPECIFIED TYPE: Primary | ICD-10-CM

## 2025-02-20 DIAGNOSIS — R06.82 TACHYPNEA: ICD-10-CM

## 2025-02-20 DIAGNOSIS — R06.09 DOE (DYSPNEA ON EXERTION): ICD-10-CM

## 2025-02-20 LAB
ALBUMIN SERPL-MCNC: 3.6 G/DL (ref 3.4–5)
ALBUMIN/GLOB SERPL: 1 {RATIO} (ref 1.1–2.2)
ALP SERPL-CCNC: 74 U/L (ref 40–129)
ALT SERPL-CCNC: 11 U/L (ref 10–40)
ANION GAP SERPL CALCULATED.3IONS-SCNC: 10 MMOL/L (ref 3–16)
AST SERPL-CCNC: 17 U/L (ref 15–37)
BASOPHILS # BLD: 0 K/UL (ref 0–0.2)
BASOPHILS NFR BLD: 0.4 %
BILIRUB SERPL-MCNC: 0.4 MG/DL (ref 0–1)
BUN SERPL-MCNC: 15 MG/DL (ref 7–20)
CALCIUM SERPL-MCNC: 8.7 MG/DL (ref 8.3–10.6)
CHLORIDE SERPL-SCNC: 106 MMOL/L (ref 99–110)
CO2 SERPL-SCNC: 24 MMOL/L (ref 21–32)
CREAT SERPL-MCNC: 0.9 MG/DL (ref 0.8–1.3)
DEPRECATED RDW RBC AUTO: 16.5 % (ref 12.4–15.4)
EKG ATRIAL RATE: 69 BPM
EKG DIAGNOSIS: NORMAL
EKG P AXIS: 38 DEGREES
EKG P-R INTERVAL: 174 MS
EKG Q-T INTERVAL: 388 MS
EKG QRS DURATION: 88 MS
EKG QTC CALCULATION (BAZETT): 415 MS
EKG R AXIS: 38 DEGREES
EKG T AXIS: 55 DEGREES
EKG VENTRICULAR RATE: 69 BPM
EOSINOPHIL # BLD: 0.1 K/UL (ref 0–0.6)
EOSINOPHIL NFR BLD: 1.2 %
GFR SERPLBLD CREATININE-BSD FMLA CKD-EPI: 84 ML/MIN/{1.73_M2}
GLUCOSE SERPL-MCNC: 104 MG/DL (ref 70–99)
HCT VFR BLD AUTO: 35 % (ref 40.5–52.5)
HGB BLD-MCNC: 11.6 G/DL (ref 13.5–17.5)
LYMPHOCYTES # BLD: 0.6 K/UL (ref 1–5.1)
LYMPHOCYTES NFR BLD: 6.7 %
MCH RBC QN AUTO: 31.2 PG (ref 26–34)
MCHC RBC AUTO-ENTMCNC: 33.3 G/DL (ref 31–36)
MCV RBC AUTO: 93.7 FL (ref 80–100)
MONOCYTES # BLD: 0.7 K/UL (ref 0–1.3)
MONOCYTES NFR BLD: 8.4 %
NEUTROPHILS # BLD: 7.1 K/UL (ref 1.7–7.7)
NEUTROPHILS NFR BLD: 83.3 %
NT-PROBNP SERPL-MCNC: 193 PG/ML (ref 0–449)
PLATELET # BLD AUTO: 164 K/UL (ref 135–450)
PMV BLD AUTO: 9.1 FL (ref 5–10.5)
POTASSIUM SERPL-SCNC: 3.9 MMOL/L (ref 3.5–5.1)
PROT SERPL-MCNC: 7.1 G/DL (ref 6.4–8.2)
RBC # BLD AUTO: 3.73 M/UL (ref 4.2–5.9)
SODIUM SERPL-SCNC: 140 MMOL/L (ref 136–145)
TROPONIN, HIGH SENSITIVITY: 10 NG/L (ref 0–22)
TROPONIN, HIGH SENSITIVITY: 12 NG/L (ref 0–22)
WBC # BLD AUTO: 8.5 K/UL (ref 4–11)

## 2025-02-20 PROCEDURE — 83880 ASSAY OF NATRIURETIC PEPTIDE: CPT

## 2025-02-20 PROCEDURE — 93010 ELECTROCARDIOGRAM REPORT: CPT | Performed by: INTERNAL MEDICINE

## 2025-02-20 PROCEDURE — 93005 ELECTROCARDIOGRAM TRACING: CPT | Performed by: STUDENT IN AN ORGANIZED HEALTH CARE EDUCATION/TRAINING PROGRAM

## 2025-02-20 PROCEDURE — 84484 ASSAY OF TROPONIN QUANT: CPT

## 2025-02-20 PROCEDURE — 99285 EMERGENCY DEPT VISIT HI MDM: CPT

## 2025-02-20 PROCEDURE — 71046 X-RAY EXAM CHEST 2 VIEWS: CPT

## 2025-02-20 PROCEDURE — 80053 COMPREHEN METABOLIC PANEL: CPT

## 2025-02-20 PROCEDURE — 85025 COMPLETE CBC W/AUTO DIFF WBC: CPT

## 2025-02-20 RX ORDER — APIXABAN 5 MG/1
5 TABLET, FILM COATED ORAL 2 TIMES DAILY
Qty: 180 TABLET | Refills: 1 | Status: SHIPPED | OUTPATIENT
Start: 2025-02-20

## 2025-02-20 RX ORDER — LIDOCAINE 50 MG/G
1 PATCH TOPICAL DAILY
Qty: 10 PATCH | Refills: 0 | Status: SHIPPED | OUTPATIENT
Start: 2025-02-20 | End: 2025-02-20

## 2025-02-20 RX ORDER — LIDOCAINE 50 MG/G
1 PATCH TOPICAL DAILY
Qty: 10 PATCH | Refills: 0 | Status: SHIPPED | OUTPATIENT
Start: 2025-02-20 | End: 2025-03-02

## 2025-02-20 ASSESSMENT — ENCOUNTER SYMPTOMS
EYES NEGATIVE: 1
WHEEZING: 0
CHEST TIGHTNESS: 1
GASTROINTESTINAL NEGATIVE: 1
SHORTNESS OF BREATH: 1
STRIDOR: 0

## 2025-02-20 ASSESSMENT — PAIN DESCRIPTION - FREQUENCY: FREQUENCY: INTERMITTENT

## 2025-02-20 ASSESSMENT — PAIN - FUNCTIONAL ASSESSMENT: PAIN_FUNCTIONAL_ASSESSMENT: 0-10

## 2025-02-20 ASSESSMENT — PAIN SCALES - GENERAL: PAINLEVEL_OUTOF10: 1

## 2025-02-20 ASSESSMENT — PAIN DESCRIPTION - ORIENTATION: ORIENTATION: LEFT

## 2025-02-20 ASSESSMENT — PAIN DESCRIPTION - LOCATION: LOCATION: CHEST

## 2025-02-20 ASSESSMENT — HEART SCORE: ECG: NORMAL

## 2025-02-20 ASSESSMENT — PAIN DESCRIPTION - PAIN TYPE: TYPE: ACUTE PAIN

## 2025-02-20 NOTE — DISCHARGE INSTRUCTIONS
You were evaluated in the emergency department for chest pain. Assessments and testing completed during your visit were reassuring and at this time there is no indication for further testing, treatment or admission to the hospital. Given this it is appropriate to discharge you from the emergency department. At the time of discharge we discussed the following:    You may try the lidocaine patches for your pain.  Please follow-up your primary doctor as well as cardiologist as we discussed and return with any new or worsening symptoms.    Please note that sometimes it is difficult to diagnose a medical condition early in the disease process before the disease is fully manifest. Because of this, should you develop any new or worsening symptoms, you may return at any time to the emergency department for another evaluation. If available you are also recommended to review this visit with your primary care physician or other medical provider in the next 7 days. Thank you for allowing us to care for you today.

## 2025-02-20 NOTE — ED NOTES
Family updated on plan of care, pt given a urinal, unable to go at this time, pt reports he does not take his medication until after dialysis.

## 2025-02-20 NOTE — PROGRESS NOTES
[facility]  2025    Saw Lane (:  1940) is a 84 y.o. male, here for evaluation of the following medical concerns:    Chief Complaint   Patient presents with    Shoulder Pain    Rib Injury        ASSESSMENT/ PLAN  1. Left-sided chest pain  -Concern for acute coronary syndrome.  Vital stable in office today.  He originally was refusing squad transport to the emergency department.  However upon standing and walking to the doorway which was about 10 feet he became very short of breath and dizzy.  He then changed his mind and we called EMS.  EKG in the office was grossly normal.  He does have a history of coronary artery disease with stent placement in the right coronary artery.  Mild vessel disease and left circumflex and LAD.  Angiogram was in .  These are found in the media tab under chart review.   -Other Differential would be PE with pain with inspiration and rationing eliquis.   -NOT CURRENTLY IN AFIB at time of EKG in office.     -He also has tachypnea, dyspnea on exertion.  -Patient was transferred by EMS services to emergency department.      - EKG 12 Lead    2. Chest pain on breathing  -See #1  - EKG 12 Lead    3. PETIT (dyspnea on exertion)  -See #1    4. Tachypnea  -See #1    5. Coronary artery disease involving native coronary artery of native heart without angina pectoris  -See #1         Today I spent 60 minutes providing clinical care with patient completing physical exam, ROS and history as well as reviewing chart, consulting clinical references and providing patient education and discussion.      No follow-ups on file.    HPI  Patient in office today for chief complaint of left-sided chest pain after progressive since this past Friday.  He attributes this to being around insulation dust because he is putting insulation in his barn.  He also reports that he was pushing a push broom at that time.  He believes that it could be secondary to a pulled muscle in his rib.  He

## 2025-03-03 ENCOUNTER — OFFICE VISIT (OUTPATIENT)
Dept: CARDIOLOGY CLINIC | Age: 85
End: 2025-03-03
Payer: MEDICARE

## 2025-03-03 VITALS
HEART RATE: 65 BPM | OXYGEN SATURATION: 98 % | SYSTOLIC BLOOD PRESSURE: 150 MMHG | HEIGHT: 69 IN | BODY MASS INDEX: 34.8 KG/M2 | WEIGHT: 235 LBS | DIASTOLIC BLOOD PRESSURE: 78 MMHG

## 2025-03-03 DIAGNOSIS — I25.10 CORONARY ARTERY DISEASE INVOLVING NATIVE CORONARY ARTERY OF NATIVE HEART WITHOUT ANGINA PECTORIS: Primary | ICD-10-CM

## 2025-03-03 DIAGNOSIS — I10 PRIMARY HYPERTENSION: ICD-10-CM

## 2025-03-03 DIAGNOSIS — Z87.891 HISTORY OF TOBACCO ABUSE: ICD-10-CM

## 2025-03-03 DIAGNOSIS — I48.0 PAROXYSMAL ATRIAL FIBRILLATION (HCC): ICD-10-CM

## 2025-03-03 DIAGNOSIS — Z79.899 MEDICATION MANAGEMENT: ICD-10-CM

## 2025-03-03 DIAGNOSIS — E78.2 MIXED HYPERLIPIDEMIA: ICD-10-CM

## 2025-03-03 PROCEDURE — 3078F DIAST BP <80 MM HG: CPT | Performed by: INTERNAL MEDICINE

## 2025-03-03 PROCEDURE — 99204 OFFICE O/P NEW MOD 45 MIN: CPT | Performed by: INTERNAL MEDICINE

## 2025-03-03 PROCEDURE — 3077F SYST BP >= 140 MM HG: CPT | Performed by: INTERNAL MEDICINE

## 2025-03-03 NOTE — PROGRESS NOTES
[facility]   Cardiac Consultation    Referring Provider:  Alfonso Grijalva APRN - CNP     No chief complaint on file.    Subjective: Mr. Lane is here today to establish cardiology care; c/o ***     History of Present Illness:   Saw Lane is a 84 y.o. male who was referred by Alfonso Grijalva APRN - CNP for  CP.  He has a PMH of HTN, HLD, AFib, CAD, h/o PCI. LHC 1/29/15 showed proximal RCA 60-70% lesion.  Mild CAD of Mid LAD and Cx.  Underwent PCI 4/10/15 of RCA.  Echo 11/30/17 EF=55%. Borderline cLVH.  LV doppler fow pattern consistent with impaired LV relaxation.  Alexsandra Nuc 11/30/17 was normal.  Carotid doppler 11/30/17 showed <50% bilateral stenosis.     Today, he reports ***    Weight today is ***#     Past Medical History:   has a past medical history of A-fib (HCC) and CAD (coronary artery disease).    Surgical History:   has a past surgical history that includes Coronary angioplasty with stent; Vasectomy; Elbow surgery; other surgical history (10/21/2024); and Total hip arthroplasty (Left, 10/21/2024).     Social History:   reports that he has never smoked. He has never used smokeless tobacco. He reports that he does not drink alcohol and does not use drugs.     Family History:  family history is not on file.     Home Medications:  Prior to Admission medications    Medication Sig Start Date End Date Taking? Authorizing Provider   ELIQUIS 5 MG TABS tablet Take 1 tablet by mouth 2 times daily 2/20/25   Alfonso Grijalva APRN - CNP   rOPINIRole (REQUIP) 0.5 MG tablet Take 1 tablet by mouth nightly as needed (for restless leg) 1/2/25   Alfonso Grijalva APRN - CNP   tamsulosin (FLOMAX) 0.4 MG capsule Take 1 capsule by mouth daily 11/7/24   Nevaeh Mckeon APRN - CNP   rosuvastatin (CRESTOR) 5 MG tablet Take 1 tablet by mouth daily 11/7/24   Nevaeh Mckeon APRN - CNP   ondansetron (ZOFRAN) 4 MG tablet Take 1 tablet by mouth 3 times daily as needed for Nausea or Vomiting 10/21/24   
BUN/Cr=16/1.12 AST=15 ALT=11 TI=553 HDL=41 LDL=61 TG=96 TSH=0.864     Assessment:     1. Coronary artery disease involving native coronary artery of native heart without angina pectoris: S/P stent to prox RCA 4/10/15 for 60-70% lesion. There are no concerning symptoms for angina currently. Recent ER OV 2/25 with CP after cough but enzymes negative with unremarkable EKG.  No CP since and will follow clinically only. He takes eliquis and not anti-platelet agent. Continue statin. No need for cardiac testing at this time.      2. Mixed hyperlipidemia: I personally reviewed most recent labs from OSH 1/3/24. Well controlled and will continue current medical regimen crestor 5mg qd. Repeat labs now and adjust if needed.      3. Primary hypertension: Elevated SBP today but reports home BP is better. Told to get home machine checked for accuracy and follow. Call if goal /85 not achieved and I will adjust.      4. Paroxysmal atrial fibrillation (HCC):  History PAF dx during [facility] on eliquis and sotalol. No symptoms. Continue eliquis 5mg BID for AC and sotalol 40mg BID for rhythm control.        5.      Tobacco abuse: Quit 1983. Prior 3ppd but off cigarettes long time. Encouraged to stay off cigarettes.    Plan:  Labs reviewed in epic and discussed with patient.  Current medications reviewed.  Refills given as warranted.  Great job on not smoking. Continue to avoid as this is risk factor for heart attack, stroke, and/or cancer.   Office will reach out to [place] for progress note, cardiac catheterization, and most recent cardiac testing   Recommend monitoring blood pressure at home 4-5 times a week. Keep a log of blood pressure and heart rate. Goal 130/80 or less. Call office for further intervention if consistently elevated above goal.    Recommend blood pressure machine checked for accuracy    Follow up with me in 6 months.     Scribe's attestation:  This note was scribed in the presence of

## 2025-03-03 NOTE — PATIENT INSTRUCTIONS
Plan:  Labs reviewed in epic and discussed with patient.  Current medications reviewed.  Refills given as warranted.  Great job on not smoking. Continue to avoid as this is risk factor for heart attack, stroke, and/or cancer.   Office will reach out to [place] for progress note, cardiac catheterization, and most recent cardiac testing   Recommend monitoring blood pressure at home 4-5 times a week. Keep a log of blood pressure and heart rate. Goal 130/80 or less. Call office for further intervention if consistently elevated above goal.    Recommend blood pressure machine checked for accuracy    Follow up with me in 6 months.

## 2025-03-04 ENCOUNTER — TELEPHONE (OUTPATIENT)
Dept: CARDIOLOGY CLINIC | Age: 85
End: 2025-03-04

## 2025-03-06 ENCOUNTER — NURSE ONLY (OUTPATIENT)
Dept: FAMILY MEDICINE CLINIC | Age: 85
End: 2025-03-06

## 2025-03-06 DIAGNOSIS — Z01.818 PRE-OP EVALUATION: ICD-10-CM

## 2025-03-06 DIAGNOSIS — Z79.899 MEDICATION MANAGEMENT: ICD-10-CM

## 2025-03-07 ENCOUNTER — TELEPHONE (OUTPATIENT)
Dept: CARDIOLOGY CLINIC | Age: 85
End: 2025-03-07

## 2025-03-07 DIAGNOSIS — E78.2 MIXED HYPERLIPIDEMIA: Primary | ICD-10-CM

## 2025-03-07 DIAGNOSIS — Z79.899 MEDICATION MANAGEMENT: ICD-10-CM

## 2025-03-07 DIAGNOSIS — I10 PRIMARY HYPERTENSION: ICD-10-CM

## 2025-03-07 LAB
REASON FOR REJECTION: NORMAL
REJECTED TEST: NORMAL

## 2025-03-07 NOTE — TELEPHONE ENCOUNTER
----- Message from Dr. Abdiaziz Mackey MD sent at 3/7/2025  9:09 AM EST -----  Check fasting lipids and TSH

## 2025-03-07 NOTE — TELEPHONE ENCOUNTER
[facility] called stated they were unable to run pts labs that were ordered- tube was not spun down. Specimen rejection in chart       Abdiaziz Mackey MD  3/7/2025  9:09 AM EST Back to Top      Check fasting lipids and TSH

## 2025-03-10 ENCOUNTER — NURSE ONLY (OUTPATIENT)
Dept: FAMILY MEDICINE CLINIC | Age: 85
End: 2025-03-10
Payer: MEDICARE

## 2025-03-10 VITALS — SYSTOLIC BLOOD PRESSURE: 122 MMHG | DIASTOLIC BLOOD PRESSURE: 68 MMHG

## 2025-03-10 DIAGNOSIS — Z79.899 MEDICATION MANAGEMENT: ICD-10-CM

## 2025-03-10 DIAGNOSIS — I10 PRIMARY HYPERTENSION: ICD-10-CM

## 2025-03-10 DIAGNOSIS — E78.2 MIXED HYPERLIPIDEMIA: ICD-10-CM

## 2025-03-10 PROCEDURE — 36415 COLL VENOUS BLD VENIPUNCTURE: CPT | Performed by: INTERNAL MEDICINE

## 2025-03-10 NOTE — PROGRESS NOTES
Blood drawn per order.  Needle size: 21 g  Site: L Antecubital.  First attempt successful Yes    Second attempt no    Pressure applied until bleeding stopped.  Gauze and tape   applied.    Patient informed to call office or return if bleeding reoccurs and unable to stop.    Tubes drawn: 1 purple     1 red

## 2025-03-10 NOTE — PROGRESS NOTES
Patient also wanted his bp machine which is a wrist cuff calibrated. I checked his bp manually it was 126/68 and with his machine it 115/58.

## 2025-03-11 ENCOUNTER — RESULTS FOLLOW-UP (OUTPATIENT)
Dept: CARDIOLOGY CLINIC | Age: 85
End: 2025-03-11

## 2025-03-11 DIAGNOSIS — Z79.899 MEDICATION MANAGEMENT: Primary | ICD-10-CM

## 2025-03-11 LAB
ALBUMIN SERPL-MCNC: 4.3 G/DL (ref 3.4–5)
ALBUMIN/GLOB SERPL: 1.5 {RATIO} (ref 1.1–2.2)
ALP SERPL-CCNC: 77 U/L (ref 40–129)
ALT SERPL-CCNC: 11 U/L (ref 10–40)
ANION GAP SERPL CALCULATED.3IONS-SCNC: 10 MMOL/L (ref 3–16)
AST SERPL-CCNC: 20 U/L (ref 15–37)
BASOPHILS # BLD: 0 K/UL (ref 0–0.2)
BASOPHILS NFR BLD: 0.8 %
BILIRUB SERPL-MCNC: 0.4 MG/DL (ref 0–1)
BUN SERPL-MCNC: 16 MG/DL (ref 7–20)
CALCIUM SERPL-MCNC: 9.8 MG/DL (ref 8.3–10.6)
CHLORIDE SERPL-SCNC: 105 MMOL/L (ref 99–110)
CHOLEST SERPL-MCNC: 107 MG/DL (ref 0–199)
CO2 SERPL-SCNC: 26 MMOL/L (ref 21–32)
CREAT SERPL-MCNC: 1.1 MG/DL (ref 0.8–1.3)
DEPRECATED RDW RBC AUTO: 16.8 % (ref 12.4–15.4)
EOSINOPHIL # BLD: 0.1 K/UL (ref 0–0.6)
EOSINOPHIL NFR BLD: 2.2 %
GFR SERPLBLD CREATININE-BSD FMLA CKD-EPI: 66 ML/MIN/{1.73_M2}
GLUCOSE SERPL-MCNC: 106 MG/DL (ref 70–99)
HCT VFR BLD AUTO: 33.9 % (ref 40.5–52.5)
HDLC SERPL-MCNC: 31 MG/DL (ref 40–60)
HGB BLD-MCNC: 10.8 G/DL (ref 13.5–17.5)
LDLC SERPL CALC-MCNC: 51 MG/DL
LYMPHOCYTES # BLD: 1.1 K/UL (ref 1–5.1)
LYMPHOCYTES NFR BLD: 18.2 %
MCH RBC QN AUTO: 30.9 PG (ref 26–34)
MCHC RBC AUTO-ENTMCNC: 32 G/DL (ref 31–36)
MCV RBC AUTO: 96.6 FL (ref 80–100)
MONOCYTES # BLD: 0.4 K/UL (ref 0–1.3)
MONOCYTES NFR BLD: 6.7 %
NEUTROPHILS # BLD: 4.5 K/UL (ref 1.7–7.7)
NEUTROPHILS NFR BLD: 72.1 %
PLATELET # BLD AUTO: 188 K/UL (ref 135–450)
PMV BLD AUTO: 9.9 FL (ref 5–10.5)
POTASSIUM SERPL-SCNC: 5 MMOL/L (ref 3.5–5.1)
PROT SERPL-MCNC: 7.2 G/DL (ref 6.4–8.2)
RBC # BLD AUTO: 3.51 M/UL (ref 4.2–5.9)
SODIUM SERPL-SCNC: 141 MMOL/L (ref 136–145)
TRIGL SERPL-MCNC: 127 MG/DL (ref 0–150)
TSH SERPL DL<=0.005 MIU/L-ACNC: 0.28 UIU/ML (ref 0.27–4.2)
VLDLC SERPL CALC-MCNC: 25 MG/DL
WBC # BLD AUTO: 6.2 K/UL (ref 4–11)

## 2025-03-31 NOTE — PROGRESS NOTES
4/1/2025    Chief Complaint   Patient presents with    Dizziness    Nausea       Saw Lane is a 84 y.o. male, presents today for:      ASSESSMENT/PLAN:    1. Vertigo  Discussed differential diagnoses with patient: BPPV, Carotid Stenosis (due to prior CAD), old CVA, hearing/ vision issues.    Will order Vascular Duplex and brain MRI to r/o Vascular issues  Referral placed to ENT for vertigo evaluation.  Pt does not require Audiology evaluation as this was completed several weeks ago.    Encouraged Eye evaluation.    Trial Scopolamine due to severe symptoms and how often occuring.  If no improvement consider Meclizine  - Vascular duplex carotid bilateral; Future  - MRI BRAIN WO CONTRAST; Future  - [facility] Physical [facility]  - [facility] N[facility] Th[facility]  - scopolamine (TRANSDERM-SCOP) transdermal patch; Place 1 patch onto the skin every 72 hours  Dispense: 1 patch; Refill: 0    2. Nausea  See above  - scopolamine (TRANSDERM-SCOP) transdermal patch; Place 1 patch onto the skin every 72 hours  Dispense: 1 patch; Refill: 0      Return in about 3 weeks (around 4/22/2025) for vertigo follow up.    Presenting today due to recurrent vertigo for the past 3 months.    He has PMH HTN, HLD, PAF dx during [facility] on eliquis and sotalol, CAD s/p RCA stent, prior tob abuse (quit 1983--3 ppd prior), and s/p Left THR 10/24. He underwent Left THR 10/2024.  Reports recurrent dizziness with position changes or high light situation for the past 3 or so months.  Episodes will last form several seconds to hours.  Worse when he is driving and has to concentrate on one part of the road.  Also occurs when his eye are closed or open.  NO falls, LOC, inability to walk in straight line.  No parasthesias, loss of vision.  Has had at least 1 hospitalization on 2/20/25 for chest pain/ dizziness without any acute findings.  No recent URI infection.  No prior balance issues.  Does wear bilateral hearing aids with left

## 2025-04-01 ENCOUNTER — OFFICE VISIT (OUTPATIENT)
Dept: FAMILY MEDICINE CLINIC | Age: 85
End: 2025-04-01
Payer: MEDICARE

## 2025-04-01 VITALS — OXYGEN SATURATION: 100 % | DIASTOLIC BLOOD PRESSURE: 60 MMHG | HEART RATE: 79 BPM | SYSTOLIC BLOOD PRESSURE: 114 MMHG

## 2025-04-01 DIAGNOSIS — R42 VERTIGO: Primary | ICD-10-CM

## 2025-04-01 DIAGNOSIS — R11.0 NAUSEA: ICD-10-CM

## 2025-04-01 PROCEDURE — 1159F MED LIST DOCD IN RCRD: CPT | Performed by: NURSE PRACTITIONER

## 2025-04-01 PROCEDURE — 3074F SYST BP LT 130 MM HG: CPT | Performed by: NURSE PRACTITIONER

## 2025-04-01 PROCEDURE — 99214 OFFICE O/P EST MOD 30 MIN: CPT | Performed by: NURSE PRACTITIONER

## 2025-04-01 PROCEDURE — 1123F ACP DISCUSS/DSCN MKR DOCD: CPT | Performed by: NURSE PRACTITIONER

## 2025-04-01 PROCEDURE — 3078F DIAST BP <80 MM HG: CPT | Performed by: NURSE PRACTITIONER

## 2025-04-01 RX ORDER — SCOPOLAMINE 1 MG/3D
1 PATCH, EXTENDED RELEASE TRANSDERMAL
Qty: 1 PATCH | Refills: 0 | Status: SHIPPED | OUTPATIENT
Start: 2025-04-01

## 2025-04-03 ENCOUNTER — TELEPHONE (OUTPATIENT)
Dept: FAMILY MEDICINE CLINIC | Age: 85
End: 2025-04-03

## 2025-04-03 NOTE — TELEPHONE ENCOUNTER
Spoke with patient to see how scopolamine patches were helping with vertigo, states he has not had an episode since leaving the office. Placed first patch yesterday and is doing very well. Patient will call back if anything changes.

## 2025-04-07 ENCOUNTER — HOSPITAL ENCOUNTER (OUTPATIENT)
Dept: MRI IMAGING | Age: 85
Discharge: HOME | End: 2025-04-07
Payer: MEDICARE

## 2025-04-07 DIAGNOSIS — R42 VERTIGO: ICD-10-CM

## 2025-04-07 PROCEDURE — 70551 MRI BRAIN STEM W/O DYE: CPT

## 2025-04-08 NOTE — PLAN OF CARE
[facility]:  [facility] [place] [address] office: [phone] fax: [phone]     Physical Therapy Initial Evaluation Certification    Dear LEONA Jose - *,    We had the pleasure of evaluating the following patient for physical therapy services at [facility].  A summary of our findings can be found in the initial assessment below.  This includes our plan of care.  If you have any questions or concerns regarding these findings, please do not hesitate to contact me at the office phone number listed above.  Thank you for the referral.     Physician Signature:_______________________________Date:__________________  By signing above (or electronic signature), therapist’s plan is approved by physician       Physical Therapy: TREATMENT/PROGRESS NOTE   Patient: Saw Lane (84 y.o. male)   Examination Date: 2025   :  1940 MRN: [id number]   Visit #: 1   Insurance Allowable Auth Needed   [place] [x]Yes    []No    Insurance: Payor: [place] BCBS MEDICARE / Plan: [facility] MEDICARE / Product Type: *No Product type* /   Insurance ID: [id number] - (Medicare Managed)  Secondary Insurance (if applicable):    Treatment Diagnosis:     ICD-10-CM    1. Dizziness  R42          Medical Diagnosis:  Vertigo [R42]   Referring Physician: Nevaeh Mckeon APRN -*  PCP: Alfonso Grijalva APRN - CNP       Plan of care signed (Y/N):     Date of Patient follow up with Physician:      Plan of Care Report: EVAL today  POC update due: (10 visits /OR AUTH LIMITS, whichever is less)   PN due 2025 or 10 visits  POC due in 10 visits or 90 days Recert                                             Medical History:  Comorbidities:  Other: see below  Relevant Medical History:   Past Medical History:   Diagnosis Date    A-fib (HCC)     CAD (coronary artery disease)                  Precautions/ Contra-indications:           Latex allergy:

## 2025-04-09 ENCOUNTER — HOSPITAL ENCOUNTER (OUTPATIENT)
Dept: PHYSICAL THERAPY | Age: 85
Setting detail: THERAPIES SERIES
Discharge: HOME | End: 2025-04-09
Payer: MEDICARE

## 2025-04-09 DIAGNOSIS — R42 DIZZINESS: Primary | ICD-10-CM

## 2025-04-09 PROCEDURE — 97112 NEUROMUSCULAR REEDUCATION: CPT

## 2025-04-09 PROCEDURE — 97161 PT EVAL LOW COMPLEX 20 MIN: CPT

## 2025-04-09 PROCEDURE — 97140 MANUAL THERAPY 1/> REGIONS: CPT

## 2025-04-10 ENCOUNTER — RESULTS FOLLOW-UP (OUTPATIENT)
Dept: FAMILY MEDICINE CLINIC | Age: 85
End: 2025-04-10

## 2025-04-11 NOTE — RESULT ENCOUNTER NOTE
MRI brain showing no stroke.  It is showing some changes in blood flow which are expected with heart disease and can predispose you to memory issues in the future.  Making sure to stay physically active and eat healthy will help to slow these effects.    How is your dizziness?

## 2025-04-14 NOTE — RESULT ENCOUNTER NOTE
Sung Jennings,     As long as Saw is OK with me discussing his issues with you I am more than happy to discuss how everything is tied together.      Thanks,  Nevaeh RUBIO

## 2025-04-17 ENCOUNTER — APPOINTMENT (OUTPATIENT)
Dept: PHYSICAL THERAPY | Age: 85
End: 2025-04-17
Payer: MEDICARE

## 2025-04-18 ENCOUNTER — OFFICE VISIT (OUTPATIENT)
Dept: ORTHOPEDIC SURGERY | Age: 85
End: 2025-04-18

## 2025-04-18 DIAGNOSIS — Z96.642 S/P TOTAL LEFT HIP ARTHROPLASTY: Primary | ICD-10-CM

## 2025-04-18 NOTE — PROGRESS NOTES
Chief Complaint  Hip Pain (CK LT HIP)      History of Present Illness:  The patient is here for 6-month evaluation regarding his left total hip arthroplasty.  He is doing well.  He is minimal pain and he is very happy.    Prior HPI 12/13/2024:  The patient is here for repeat evaluation regarding his left total hip arthroplasty.  Overall he is doing well.  He has a little bit of tenderness over the incision.    Prior HPI 11/5/2024:  Saw Lane is a pleasant 84 y.o. male here today for follow up regarding their hip replacement. He is 2 weeks s/p L SAMUEL on 10/21/24. Patient is doing well and denies complications.         Medical History:  Patient's medications, allergies, past medical, surgical, social and family histories were reviewed and updated as appropriate.    Pertinent items are noted in HPI  Review of systems reviewed from Patient History Form dated on 4/18/25 and available in the patient's chart under the Media tab.       Vital Signs:  There were no vitals filed for this visit.      Constitutional: In no apparent distress. Normal affect. Alert and oriented X3 and is cooperative.       L Hip Examination:    Well healed surgical incision, no signs of infection or drainage. EHL/FHL/TA/GS complex motor intact. Sural, saphenous, superificial/deep peroneal, and plantar nerve distribution sensation grossly intact. Dorsalis pedis/posterior tibial pulses 2+ with BCR.  5 out of 5 hip flexion strength noted.      Radiology:       2 views of the L hip taken in the office today demonstrate a stable total hip arthroplasty without complication.           Assessment :  84 y.o. male s/p L hip SAMUEL 10/21/24, 6 months out    Impression:  Encounter Diagnosis   Name Primary?    S/P total left hip arthroplasty Yes         Office Procedures:  Orders Placed This Encounter   Procedures    XR HIP LEFT (2-3 VIEWS)     Standing Status:   Future     Number of Occurrences:   1     Expected Date:   4/18/2025     Expiration Date:   4/17/2026

## 2025-04-22 ENCOUNTER — OFFICE VISIT (OUTPATIENT)
Dept: FAMILY MEDICINE CLINIC | Age: 85
End: 2025-04-22
Payer: MEDICARE

## 2025-04-22 VITALS
DIASTOLIC BLOOD PRESSURE: 48 MMHG | WEIGHT: 230 LBS | SYSTOLIC BLOOD PRESSURE: 98 MMHG | BODY MASS INDEX: 33.95 KG/M2 | OXYGEN SATURATION: 97 % | HEART RATE: 55 BPM

## 2025-04-22 DIAGNOSIS — R42 VERTIGO: Primary | ICD-10-CM

## 2025-04-22 DIAGNOSIS — D64.9 ANEMIA, UNSPECIFIED TYPE: ICD-10-CM

## 2025-04-22 DIAGNOSIS — I10 PRIMARY HYPERTENSION: ICD-10-CM

## 2025-04-22 DIAGNOSIS — I48.0 PAROXYSMAL ATRIAL FIBRILLATION (HCC): ICD-10-CM

## 2025-04-22 LAB
DEPRECATED RDW RBC AUTO: 16 % (ref 12.4–15.4)
HCT VFR BLD AUTO: 26.5 % (ref 40.5–52.5)
HGB BLD-MCNC: 8.3 G/DL (ref 13.5–17.5)
MCH RBC QN AUTO: 28.5 PG (ref 26–34)
MCHC RBC AUTO-ENTMCNC: 31.2 G/DL (ref 31–36)
MCV RBC AUTO: 91.2 FL (ref 80–100)
PLATELET # BLD AUTO: 197 K/UL (ref 135–450)
PMV BLD AUTO: 9.3 FL (ref 5–10.5)
RBC # BLD AUTO: 2.9 M/UL (ref 4.2–5.9)
WBC # BLD AUTO: 4.1 K/UL (ref 4–11)

## 2025-04-22 PROCEDURE — 1123F ACP DISCUSS/DSCN MKR DOCD: CPT | Performed by: NURSE PRACTITIONER

## 2025-04-22 PROCEDURE — 1159F MED LIST DOCD IN RCRD: CPT | Performed by: NURSE PRACTITIONER

## 2025-04-22 PROCEDURE — 3078F DIAST BP <80 MM HG: CPT | Performed by: NURSE PRACTITIONER

## 2025-04-22 PROCEDURE — 36415 COLL VENOUS BLD VENIPUNCTURE: CPT | Performed by: NURSE PRACTITIONER

## 2025-04-22 PROCEDURE — 99214 OFFICE O/P EST MOD 30 MIN: CPT | Performed by: NURSE PRACTITIONER

## 2025-04-22 PROCEDURE — 1160F RVW MEDS BY RX/DR IN RCRD: CPT | Performed by: NURSE PRACTITIONER

## 2025-04-22 PROCEDURE — 3074F SYST BP LT 130 MM HG: CPT | Performed by: NURSE PRACTITIONER

## 2025-04-22 RX ORDER — SOTALOL HYDROCHLORIDE 80 MG/1
40 TABLET ORAL 2 TIMES DAILY
Qty: 60 TABLET | Refills: 2 | Status: SHIPPED | OUTPATIENT
Start: 2025-04-22

## 2025-04-22 SDOH — ECONOMIC STABILITY: FOOD INSECURITY: WITHIN THE PAST 12 MONTHS, THE FOOD YOU BOUGHT JUST DIDN'T LAST AND YOU DIDN'T HAVE MONEY TO GET MORE.: NEVER TRUE

## 2025-04-22 SDOH — ECONOMIC STABILITY: FOOD INSECURITY: WITHIN THE PAST 12 MONTHS, YOU WORRIED THAT YOUR FOOD WOULD RUN OUT BEFORE YOU GOT MONEY TO BUY MORE.: NEVER TRUE

## 2025-04-22 NOTE — PROGRESS NOTES
4/22/2025    Chief Complaint   Patient presents with    Dizziness     Has not had any problems for 3 wks but he has not been driving either        Saw Lane is a 84 y.o. male, presents today for:      ASSESSMENT/PLAN:  1. Vertigo  Clinically improved today since last OV.  Continue PRN Scopolamine patch.  Noted anemia from 2/2025-- updated labs ordered today.  Pending labs may consider colonoscopy.  Also noted hypotension today.  Will decrease Sotalol to 40 mg BID as previously recommended by cardiology.  Pending symptoms consider referral to ENT    2. Anemia, unspecified type  See above  - CBC    3. Primary hypertension  See above  - sotalol (BETAPACE) 80 MG tablet; Take 0.5 tablets by mouth 2 times daily  Dispense: 60 tablet; Refill: 2    4. Paroxysmal atrial fibrillation (HCC)  See above.   - sotalol (BETAPACE) 80 MG tablet; Take 0.5 tablets by mouth 2 times daily  Dispense: 60 tablet; Refill: 2    Return in about 4 weeks (around 5/20/2025) for Nevaeh- changing PCP. .    Presenting today due to recurrent vertigo for the past 3 months.    Initial HPI 4/1/25  He has PMH HTN, HLD, PAF dx during LHC on eliquis and sotalol, CAD s/p RCA stent, prior tob abuse (quit 1983--3 ppd prior), and s/p Left THR 10/24. He underwent Left THR 10/2024.  Reports recurrent dizziness with position changes or high light situation for the past 3 or so months.  Episodes will last form several seconds to hours.  Worse when he is driving and has to concentrate on one part of the road.  Also occurs when his eye are closed or open.  NO falls, LOC, inability to walk in straight line.  No parasthesias, loss of vision.  Has had at least 1 hospitalization on 2/20/25 for chest pain/ dizziness without any acute findings.  No recent URI infection.  No prior balance issues.  Does wear bilateral hearing aids with left ear>right.  Follows with Audiology who adjusted hearing aids 2-3 weeks ago.  No recent Optomotry/ Ophthalmology evaluation.  Does

## 2025-04-23 ENCOUNTER — RESULTS FOLLOW-UP (OUTPATIENT)
Dept: FAMILY MEDICINE CLINIC | Age: 85
End: 2025-04-23

## 2025-04-23 DIAGNOSIS — D64.9 ANEMIA, UNSPECIFIED TYPE: Primary | ICD-10-CM

## 2025-04-23 NOTE — RESULT ENCOUNTER NOTE
Anemia is worse since last month.  Recommend GI evaluation Bedside ALFREDO completed. Pt tolerated well. Pt sedated with 50 mcg IV Fentanyl and 4 mg iV Versed.

## 2025-04-23 NOTE — RESULT ENCOUNTER NOTE
Patient will have to call and schedule OV with GI.  Here is number for referral:     Referred To:          Jay Taveras [address] [place]  [facility] [address]        Phone:   [phone]

## 2025-04-24 ENCOUNTER — APPOINTMENT (OUTPATIENT)
Dept: PHYSICAL THERAPY | Age: 85
End: 2025-04-24
Payer: MEDICARE

## 2025-04-25 NOTE — RESULT ENCOUNTER NOTE
That is his decision.  There are other ways to try to get similar images but he would have to go see GI to discuss

## 2025-04-28 ENCOUNTER — TELEPHONE (OUTPATIENT)
Dept: FAMILY MEDICINE CLINIC | Age: 85
End: 2025-04-28

## 2025-04-28 NOTE — TELEPHONE ENCOUNTER
Patient was referred to Dr Taveras. His office is needing referral, last office notes, and most recent lab work. Att:Brittney

## 2025-05-05 DIAGNOSIS — R35.1 BENIGN PROSTATIC HYPERPLASIA WITH NOCTURIA: ICD-10-CM

## 2025-05-05 DIAGNOSIS — E78.2 MIXED HYPERLIPIDEMIA: ICD-10-CM

## 2025-05-05 DIAGNOSIS — N40.1 BENIGN PROSTATIC HYPERPLASIA WITH NOCTURIA: ICD-10-CM

## 2025-05-05 RX ORDER — ROSUVASTATIN CALCIUM 5 MG/1
5 TABLET, COATED ORAL DAILY
Qty: 90 TABLET | Refills: 1 | Status: SHIPPED | OUTPATIENT
Start: 2025-05-05

## 2025-05-05 RX ORDER — TAMSULOSIN HYDROCHLORIDE 0.4 MG/1
0.4 CAPSULE ORAL DAILY
Qty: 90 CAPSULE | Refills: 1 | Status: SHIPPED | OUTPATIENT
Start: 2025-05-05

## 2025-05-05 RX ORDER — ROSUVASTATIN CALCIUM 5 MG/1
5 TABLET, COATED ORAL DAILY
Qty: 90 TABLET | Refills: 1 | OUTPATIENT
Start: 2025-05-05

## 2025-05-05 NOTE — TELEPHONE ENCOUNTER
Refill Request     CONFIRM preferrred pharmacy with the patient.    If Mail Order Rx - Pend for 90 day refill.      Last Seen: Last Seen Department: 4/22/2025  Last Seen by PCP: 4/22/2025    Last Written: 11/7/24    If no future appointment scheduled, route STAFF MESSAGE with patient name to the  Pool for scheduling.      Next Appointment:   Future Appointments   Date Time Provider Department Center   5/8/2025  8:00 AM [facility] VASC LAB 1 [facility] [place] [facility]   5/12/2025 10:30 AM Mona Zimmerman AuD [facility]   5/12/2025 11:00 AM Chato Cuevas DO [facility]   5/22/2025  9:40 AM Nevaeh Mckeon APRN - CNP [facility] E[facility] DEP   9/15/2025  1:30 PM Abdiaziz Mackey MD [facility]   9/22/2025 10:00 AM Kee Ireland DO [facility]       Message sent to  to schedule appt with patient?  NO      Requested Prescriptions     Pending Prescriptions Disp Refills    rosuvastatin (CRESTOR) 5 MG tablet 90 tablet 1     Sig: Take 1 tablet by mouth daily

## 2025-05-07 ENCOUNTER — PATIENT MESSAGE (OUTPATIENT)
Dept: FAMILY MEDICINE CLINIC | Age: 85
End: 2025-05-07

## 2025-05-07 NOTE — PROGRESS NOTES
[facility]  DIVISION OF OTOLARYNGOLOGY- HEAD & NECK SURGERY  CONSULT    Patient Name: Saw Lane  Medical Record Number:  [id number]  Primary Care Physician:  Alfonso Grijalva APRN - CNP  Date of Consultation: 5/12/2025    Chief Complaint:   Chief Complaint   Patient presents with    New Patient    Dizziness     Happening when driving x 6 wks ago.       HISTORY OF PRESENT ILLNESS  Saw is a(n) 84 y.o. male who presents for evaluation of hearing loss and possible wax impaction.  He states that last week he had ear pain on the left side which went down into his neck.  It has slowly gotten better.  He does use hearing aids on a daily basis but states the left ear has not been hearing well.    Interval History 05/12/25  Saw was having dizziness with driving especially long distances.  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  Wears hearing aids.    Patient Active Problem List   Diagnosis    Coronary artery disease involving native coronary artery of native heart without angina pectoris    Mixed hyperlipidemia    Primary hypertension    Benign prostatic hyperplasia with nocturia    Environmental allergies    Class 1 obesity without serious comorbidity with body mass index (BMI) of 33.0 to 33.9 in adult    Bilateral chronic knee pain    Chronic left hip pain    Paroxysmal atrial fibrillation (HCC)    Medication management    History of tobacco abuse     Past Surgical History:   Procedure Laterality Date    CORONARY ANGIOPLASTY WITH STENT PLACEMENT      ELBOW SURGERY      OTHER SURGICAL HISTORY  10/21/2024    LEFT ANTERIOR TOTAL HIP ARTHOPLASTY --BLOCK-- - Left    TOTAL HIP ARTHROPLASTY Left 10/21/2024    LEFT ANTERIOR TOTAL HIP ARTHOPLASTY --BLOCK-- performed by Kee Youngblood DO at [facility]    VASECTOMY       No family history on file.  Social History     Socioeconomic History    Marital status:      Spouse

## 2025-05-08 ENCOUNTER — RESULTS FOLLOW-UP (OUTPATIENT)
Dept: FAMILY MEDICINE CLINIC | Age: 85
End: 2025-05-08

## 2025-05-08 ENCOUNTER — HOSPITAL ENCOUNTER (OUTPATIENT)
Age: 85
Discharge: HOME | End: 2025-05-10
Payer: MEDICARE

## 2025-05-08 DIAGNOSIS — R42 VERTIGO: ICD-10-CM

## 2025-05-08 LAB
VAS LEFT ARM BP: 122 MMHG
VAS LEFT CCA DIST EDV: 33.7 CM/S
VAS LEFT CCA DIST PSV: 151 CM/S
VAS LEFT CCA MID EDV: 32.1 CM/S
VAS LEFT CCA MID PSV: 110 CM/S
VAS LEFT CCA PROX EDV: 25.9 CM/S
VAS LEFT CCA PROX PSV: 111 CM/S
VAS LEFT ECA PSV: 207 CM/S
VAS LEFT ICA DIST EDV: 29.5 CM/S
VAS LEFT ICA DIST PSV: 109 CM/S
VAS LEFT ICA MID EDV: 34.3 CM/S
VAS LEFT ICA MID PSV: 109 CM/S
VAS LEFT ICA PROX EDV: 19.1 CM/S
VAS LEFT ICA PROX PSV: 131 CM/S
VAS LEFT ICA/CCA PSV: 1.19
VAS LEFT SUBCLAVIAN PROX PSV: 120 CM/S
VAS LEFT VERTEBRAL EDV: 13.3 CM/S
VAS LEFT VERTEBRAL PSV: 62.7 CM/S
VAS RIGHT ARM BP: 124 MMHG
VAS RIGHT CCA DIST EDV: 18.6 CM/S
VAS RIGHT CCA DIST PSV: 83.2 CM/S
VAS RIGHT CCA MID EDV: 17.4 CM/S
VAS RIGHT CCA MID PSV: 85.1 CM/S
VAS RIGHT CCA PROX EDV: 19.9 CM/S
VAS RIGHT CCA PROX PSV: 95.1 CM/S
VAS RIGHT ECA PSV: 155 CM/S
VAS RIGHT ICA DIST EDV: 25.9 CM/S
VAS RIGHT ICA DIST PSV: 118 CM/S
VAS RIGHT ICA MID EDV: 29 CM/S
VAS RIGHT ICA MID PSV: 91.7 CM/S
VAS RIGHT ICA PROX EDV: 24.6 CM/S
VAS RIGHT ICA PROX PSV: 78.1 CM/S
VAS RIGHT ICA/CCA PSV: 0.92
VAS RIGHT SUBCLAVIAN PROX PSV: 155 CM/S
VAS RIGHT VERTEBRAL EDV: 11 CM/S
VAS RIGHT VERTEBRAL PSV: 57.5 CM/S

## 2025-05-08 PROCEDURE — 93880 EXTRACRANIAL BILAT STUDY: CPT | Performed by: INTERNAL MEDICINE

## 2025-05-08 PROCEDURE — 93880 EXTRACRANIAL BILAT STUDY: CPT

## 2025-05-08 NOTE — RESULT ENCOUNTER NOTE
Carotid duplex showing possible abnormality  on left external carotid artery.  If the dizziness has resolved lets not work this up but discuss next time

## 2025-05-09 NOTE — RESULT ENCOUNTER NOTE
Lets see what happens with the colonoscopy.  After that we will discuss.      I still am concerned it may be from the anemia.  I don't think the findings that were on this scan are significant enough to be the only issue causing the dizziness Split-Thickness Skin Graft Text: The defect edges were debeveled with a #15 scalpel blade.  Given the location of the defect, shape of the defect and the proximity to free margins a split thickness skin graft was deemed most appropriate.  Using a sterile surgical marker, the primary defect shape was transferred to the donor site. The split thickness graft was then harvested.  The skin graft was then placed in the primary defect and oriented appropriately.

## 2025-05-12 ENCOUNTER — PROCEDURE VISIT (OUTPATIENT)
Dept: AUDIOLOGY | Age: 85
End: 2025-05-12
Payer: MEDICARE

## 2025-05-12 ENCOUNTER — OFFICE VISIT (OUTPATIENT)
Dept: ENT CLINIC | Age: 85
End: 2025-05-12
Payer: MEDICARE

## 2025-05-12 VITALS
SYSTOLIC BLOOD PRESSURE: 132 MMHG | HEIGHT: 69 IN | HEART RATE: 65 BPM | DIASTOLIC BLOOD PRESSURE: 60 MMHG | BODY MASS INDEX: 34.07 KG/M2 | WEIGHT: 230 LBS

## 2025-05-12 DIAGNOSIS — R42 DIZZINESS AND GIDDINESS: ICD-10-CM

## 2025-05-12 DIAGNOSIS — R42 DIZZINESS: ICD-10-CM

## 2025-05-12 DIAGNOSIS — H93.13 TINNITUS OF BOTH EARS: ICD-10-CM

## 2025-05-12 DIAGNOSIS — H90.3 SENSORINEURAL HEARING LOSS, BILATERAL: Primary | ICD-10-CM

## 2025-05-12 DIAGNOSIS — H90.3 ASYMMETRIC SNHL (SENSORINEURAL HEARING LOSS): Primary | ICD-10-CM

## 2025-05-12 PROCEDURE — 1160F RVW MEDS BY RX/DR IN RCRD: CPT | Performed by: STUDENT IN AN ORGANIZED HEALTH CARE EDUCATION/TRAINING PROGRAM

## 2025-05-12 PROCEDURE — 3078F DIAST BP <80 MM HG: CPT | Performed by: STUDENT IN AN ORGANIZED HEALTH CARE EDUCATION/TRAINING PROGRAM

## 2025-05-12 PROCEDURE — 1123F ACP DISCUSS/DSCN MKR DOCD: CPT | Performed by: STUDENT IN AN ORGANIZED HEALTH CARE EDUCATION/TRAINING PROGRAM

## 2025-05-12 PROCEDURE — 1159F MED LIST DOCD IN RCRD: CPT | Performed by: STUDENT IN AN ORGANIZED HEALTH CARE EDUCATION/TRAINING PROGRAM

## 2025-05-12 PROCEDURE — 99214 OFFICE O/P EST MOD 30 MIN: CPT | Performed by: STUDENT IN AN ORGANIZED HEALTH CARE EDUCATION/TRAINING PROGRAM

## 2025-05-12 PROCEDURE — 3075F SYST BP GE 130 - 139MM HG: CPT | Performed by: STUDENT IN AN ORGANIZED HEALTH CARE EDUCATION/TRAINING PROGRAM

## 2025-05-12 PROCEDURE — 92557 COMPREHENSIVE HEARING TEST: CPT | Performed by: AUDIOLOGIST

## 2025-05-12 PROCEDURE — 92567 TYMPANOMETRY: CPT | Performed by: AUDIOLOGIST

## 2025-05-12 ASSESSMENT — ENCOUNTER SYMPTOMS
EYE PAIN: 0
COUGH: 0
VOMITING: 0
SHORTNESS OF BREATH: 0
RHINORRHEA: 0
NAUSEA: 0

## 2025-05-12 NOTE — PROGRESS NOTES
Saw Lane   1940, 84 y.o. male   [id number]       Referring Provider: Chato Cuevas DO  Referral Type: In an order in Epic    Reason for Visit: Evaluation of the cause of disorders of hearing, tinnitus, or balance.    ADULT AUDIOLOGIC EVALUATION      Saw Lane is a 84 y.o. male seen today, 5/12/2025 , for an initial audiologic evaluation.  Patient was seen by Chato Cuevas DO following today's evaluation. Patient was alone.    AUDIOLOGIC AND OTHER PERTINENT MEDICAL HISTORY:      Saw Lane noted tinnitus, imbalance, and family history of hearing loss.  Patient reports a lightheaded to syncope occurrences with driving a car.  This started after a change in his medication.  He currently wears hearing aid purchased from an outside clinic.  Patient noted having intermittent bilateral tinnitus with improvement from the use of the hearing aids.  Patient has a family history of hearing loss occurring later in life.  Medical history is reportedly significant for heart stent placement and blocked left artery.     Saw Lane denied otalgia, aural fullness, history of occupational/recreational noise exposure, history of head trauma, and history of ear surgery.    Date: 5/12/2025     IMPRESSIONS:      Normal middle ear pressure and compliance, bilaterally.  Abnormal asymmetric hearing loss, left worse than right, which can affect every day listening needs.  Word understanding was very poor in the left ear and excellent in the right ear presented at elevated sensation levels.  Continue to wear hearing aids during all waking hours.  Follow medical recommendations of Chato Cuevas DO.     ASSESSMENT AND FINDINGS:     Otoscopy revealed: Clear ear canals bilaterally    RIGHT EAR:  Hearing Sensitivity: Normal hearing sensitivity to a severe sensorineural hearing loss from 250-8000 Hz  Speech Recognition Threshold: 35 dB HL  Word Recognition:Excellent (96%), based on NU-6 25-word list at 80 dBHL using recorded speech stimuli.

## 2025-05-14 ENCOUNTER — TELEMEDICINE (OUTPATIENT)
Dept: FAMILY MEDICINE CLINIC | Age: 85
End: 2025-05-14
Payer: MEDICARE

## 2025-05-14 DIAGNOSIS — I48.0 PAROXYSMAL ATRIAL FIBRILLATION (HCC): ICD-10-CM

## 2025-05-14 DIAGNOSIS — I10 PRIMARY HYPERTENSION: ICD-10-CM

## 2025-05-14 DIAGNOSIS — M25.552 CHRONIC LEFT HIP PAIN: ICD-10-CM

## 2025-05-14 DIAGNOSIS — I25.10 CORONARY ARTERY DISEASE INVOLVING NATIVE CORONARY ARTERY OF NATIVE HEART WITHOUT ANGINA PECTORIS: ICD-10-CM

## 2025-05-14 DIAGNOSIS — Z00.00 INITIAL MEDICARE ANNUAL WELLNESS VISIT: Primary | ICD-10-CM

## 2025-05-14 DIAGNOSIS — E78.2 MIXED HYPERLIPIDEMIA: ICD-10-CM

## 2025-05-14 DIAGNOSIS — G89.29 CHRONIC LEFT HIP PAIN: ICD-10-CM

## 2025-05-14 PROCEDURE — G0438 PPPS, INITIAL VISIT: HCPCS

## 2025-05-14 PROCEDURE — 1123F ACP DISCUSS/DSCN MKR DOCD: CPT

## 2025-05-14 PROCEDURE — 1159F MED LIST DOCD IN RCRD: CPT

## 2025-05-14 ASSESSMENT — PATIENT HEALTH QUESTIONNAIRE - PHQ9
1. LITTLE INTEREST OR PLEASURE IN DOING THINGS: NOT AT ALL
2. FEELING DOWN, DEPRESSED OR HOPELESS: NOT AT ALL
SUM OF ALL RESPONSES TO PHQ QUESTIONS 1-9: 0

## 2025-05-14 ASSESSMENT — LIFESTYLE VARIABLES
HOW MANY STANDARD DRINKS CONTAINING ALCOHOL DO YOU HAVE ON A TYPICAL DAY: PATIENT DOES NOT DRINK
HOW OFTEN DO YOU HAVE A DRINK CONTAINING ALCOHOL: NEVER

## 2025-05-14 NOTE — PROGRESS NOTES
Medicare Annual Wellness Visit    Saw Lane is here for No chief complaint on file.    Assessment & Plan   1. Initial Medicare annual wellness visit  - Annual wellness visit today    2. Paroxysmal atrial fibrillation (HCC)  -I spoke with previous cardiologist and it was determined that he had paroxysmal atrial fibrillation.  Now on sotalol 40 mg twice a day.  - Consider avoiding Eliquis considering blood loss    3. Coronary artery disease involving native coronary artery of native heart without angina pectoris  -History of cardiac stent    4. Primary hypertension  -Stable on sotalol 40 mg twice a day    5. Chronic left hip pain  -Doing better since hip replacement    6. Mixed hyperlipidemia  -Continue rosuvastatin 5 mg.       No follow-ups on file.     Subjective       Patient's complete Health Risk Assessment and screening values have been reviewed and are found in Flowsheets. The following problems were reviewed today and where indicated follow up appointments were made and/or referrals ordered.    Positive Risk Factor Screenings with Interventions:    Fall Risk:  Do you feel unsteady or are you worried about falling? : (!) yes  2 or more falls in past year?: no  Fall with injury in past year?: no     Interventions:    Reviewed medications, home hazards, visual acuity, and co-morbidities that can increase risk for falls    Cognitive:      Words recalled: 0 Words Recalled     Total Score Interpretation: Abnormal Mini-Cog  Interventions:  Patient declines any further evaluation or treatment           General HRA Questions:  Select all that apply: (!) New or Increased Pain, New or Increased Fatigue, Loneliness, Social Isolation, Stress, Anger  Interventions - Pain:  Patient declined any further interventions or treatment  Interventions Fatigue:  Secondary to blood loss suspected  Interventions - Loneliness:  Patient declined any further interventions or treatment  Interventions - Social Isolation:  Living with

## 2025-05-22 ENCOUNTER — OFFICE VISIT (OUTPATIENT)
Dept: FAMILY MEDICINE CLINIC | Age: 85
End: 2025-05-22
Payer: MEDICARE

## 2025-05-22 VITALS — SYSTOLIC BLOOD PRESSURE: 118 MMHG | BODY MASS INDEX: 34.26 KG/M2 | WEIGHT: 232 LBS | DIASTOLIC BLOOD PRESSURE: 60 MMHG

## 2025-05-22 DIAGNOSIS — I25.10 CORONARY ARTERY DISEASE INVOLVING NATIVE CORONARY ARTERY OF NATIVE HEART WITHOUT ANGINA PECTORIS: ICD-10-CM

## 2025-05-22 DIAGNOSIS — R35.1 BENIGN PROSTATIC HYPERPLASIA WITH NOCTURIA: ICD-10-CM

## 2025-05-22 DIAGNOSIS — D64.9 ANEMIA, UNSPECIFIED TYPE: ICD-10-CM

## 2025-05-22 DIAGNOSIS — N40.1 BENIGN PROSTATIC HYPERPLASIA WITH NOCTURIA: ICD-10-CM

## 2025-05-22 DIAGNOSIS — M54.50 ACUTE RIGHT-SIDED LOW BACK PAIN WITHOUT SCIATICA: Primary | ICD-10-CM

## 2025-05-22 DIAGNOSIS — K59.04 CHRONIC IDIOPATHIC CONSTIPATION: ICD-10-CM

## 2025-05-22 DIAGNOSIS — I10 PRIMARY HYPERTENSION: ICD-10-CM

## 2025-05-22 DIAGNOSIS — I48.0 PAROXYSMAL ATRIAL FIBRILLATION (HCC): ICD-10-CM

## 2025-05-22 PROCEDURE — 1159F MED LIST DOCD IN RCRD: CPT | Performed by: NURSE PRACTITIONER

## 2025-05-22 PROCEDURE — 99214 OFFICE O/P EST MOD 30 MIN: CPT | Performed by: NURSE PRACTITIONER

## 2025-05-22 PROCEDURE — 3074F SYST BP LT 130 MM HG: CPT | Performed by: NURSE PRACTITIONER

## 2025-05-22 PROCEDURE — 96372 THER/PROPH/DIAG INJ SC/IM: CPT | Performed by: NURSE PRACTITIONER

## 2025-05-22 PROCEDURE — 3078F DIAST BP <80 MM HG: CPT | Performed by: NURSE PRACTITIONER

## 2025-05-22 PROCEDURE — 1123F ACP DISCUSS/DSCN MKR DOCD: CPT | Performed by: NURSE PRACTITIONER

## 2025-05-22 RX ORDER — TRAMADOL HYDROCHLORIDE 50 MG/1
50 TABLET ORAL EVERY 8 HOURS PRN
Qty: 9 TABLET | Refills: 0 | Status: SHIPPED | OUTPATIENT
Start: 2025-05-22 | End: 2025-05-25

## 2025-05-22 RX ORDER — TRIAMCINOLONE ACETONIDE 40 MG/ML
60 INJECTION, SUSPENSION INTRA-ARTICULAR; INTRAMUSCULAR ONCE
Status: COMPLETED | OUTPATIENT
Start: 2025-05-22 | End: 2025-05-22

## 2025-05-22 RX ORDER — METHYLPREDNISOLONE 4 MG/1
TABLET ORAL
Qty: 1 KIT | Refills: 0 | Status: SHIPPED | OUTPATIENT
Start: 2025-05-22 | End: 2025-05-28

## 2025-05-22 RX ORDER — TIZANIDINE 2 MG/1
2 TABLET ORAL EVERY 8 HOURS PRN
Qty: 10 TABLET | Refills: 0 | Status: SHIPPED | OUTPATIENT
Start: 2025-05-22

## 2025-05-22 RX ORDER — TAMSULOSIN HYDROCHLORIDE 0.4 MG/1
0.4 CAPSULE ORAL DAILY
Qty: 90 CAPSULE | Refills: 3 | Status: SHIPPED | OUTPATIENT
Start: 2025-05-22

## 2025-05-22 RX ADMIN — TRIAMCINOLONE ACETONIDE 60 MG: 40 INJECTION, SUSPENSION INTRA-ARTICULAR; INTRAMUSCULAR at 10:04

## 2025-05-22 NOTE — PATIENT INSTRUCTIONS
To relieve constipation, focus on incorporating fiber-rich foods into your diet, along with increased fluid intake. Good choices include fruits like prunes, kiwis, and apples; vegetables such as spinach and broccoli; whole grains like oatmeal; and legumes like beans and lentils. Remember to drink plenty of water and consider fiber supplements if needed.   Here's a more detailed look:  High-Fiber Foods:  Fruits: Prunes are particularly effective due to their sorbitol content, which has laxative properties. Kiwis, apples, pears, and berries are also good sources of fiber.   Vegetables: Spinach, broccoli, and other leafy greens are rich in fiber.   Legumes: Beans, lentils, and peas are excellent sources of fiber.   Whole Grains: Oatmeal, brown rice, and whole wheat bread are high in fiber.   Nuts and Seeds: Almonds, walnuts, and pura seeds can add fiber to your diet.   Fluids:  Drinking plenty of water helps soften stools and aids in digestion.   Fiber Supplements:  If dietary changes aren't enough, consider fiber supplements like psyllium.   Other Helpful Tips:  Exercise: Regular physical activity can stimulate bowel movements.   Bowel Habits: Try to establish a regular bowel movement routine.   Consider Hot Beverages: Hot beverages may help stimulate a bowel movement.   Foods to limit or avoid:   Low-Fiber Foods:    White rice, white bread, and refined grains can be less helpful for constipation.   Foods that Can Cause Gas:    Beans and brussels sprouts can contribute to gas and bloating, which may worsen constipation.   Carbonated Drinks:    These can also lead to gas and bloating.   Excessive Dairy:    While some dairy products like yogurt and kefir contain probiotics that can help with digestion, excessive dairy consumption might not be ideal for everyone.       Over-the-counter (OTC) medications that can help with constipation include:   Laxatives:   Stimulant laxatives:    These work by stimulating the muscles

## 2025-05-22 NOTE — PROGRESS NOTES
5/22/2025    Chief Complaint   Patient presents with    Dizziness    Shortness of Breath    Back Pain     Started about a week ago just above his belt line states it is a steady constant pain        Saw Lane is a 85 y.o. male, presents today for:      ASSESSMENT/PLAN:  1. Acute right-sided low back pain without sciatica  Likely acute strain per exam.   Encourage gentle ROM, demonstrated how to complete.  Packet given  Kenalog injection given today in office due to severity of pain.  Medrol Dose Hector given to start tomorrow.  Unable to take NSAIDs due to Eliquis use.  Start Tizanidine as needed.    Will call next week for update.   - methylPREDNISolone (MEDROL, HECTOR,) 4 MG tablet; Take by mouth.  Dispense: 1 kit; Refill: 0  - traMADol (ULTRAM) 50 MG tablet; Take 1 tablet by mouth every 8 hours as needed for Pain for up to 3 days. Intended supply: 3 days. Take lowest dose possible to manage pain Max Daily Amount: 150 mg  Dispense: 9 tablet; Refill: 0  - tiZANidine (ZANAFLEX) 2 MG tablet; Take 1 tablet by mouth every 8 hours as needed (back pain)  Dispense: 10 tablet; Refill: 0  - triamcinolone acetonide (KENALOG-40) injection 60 mg    2. Anemia, unspecified type  Likely symptomatic with dizziness and now shortness of breath.    Slowly worsening anemia over the past 3 months: Hg 11.6 2/20/25-->10.8 3/10/25--> 8.3 4/22/25  Negative colonoscopy but had poor prep.  Pt was reluctant to undergo initiation procedure and would prefer to not undergo additional procedure.  EGD not done per pt but was discussed.  Pending updated labs ordered today will again discuss.  Will likely discontinue Eliquis due to worsening anemia without known source.  Will draw iron and B12/ Folate labs tomorrow-- consider iron infusion due to worsening shortness of breath and dizziness.    - Iron and TIBC; Future  - Ferritin; Future  - Vitamin B12 & Folate; Future  - CBC with Auto Differential; Future  - Basic Metabolic Panel; Future  - POCT

## 2025-05-23 ENCOUNTER — CLINICAL SUPPORT (OUTPATIENT)
Dept: FAMILY MEDICINE CLINIC | Age: 85
End: 2025-05-23

## 2025-05-23 ENCOUNTER — RESULTS FOLLOW-UP (OUTPATIENT)
Dept: FAMILY MEDICINE CLINIC | Age: 85
End: 2025-05-23

## 2025-05-23 ENCOUNTER — PATIENT MESSAGE (OUTPATIENT)
Dept: FAMILY MEDICINE CLINIC | Age: 85
End: 2025-05-23
Payer: MEDICARE

## 2025-05-23 DIAGNOSIS — D64.9 ANEMIA, UNSPECIFIED TYPE: ICD-10-CM

## 2025-05-23 DIAGNOSIS — I48.0 PAROXYSMAL ATRIAL FIBRILLATION (HCC): ICD-10-CM

## 2025-05-23 DIAGNOSIS — N40.1 BENIGN PROSTATIC HYPERPLASIA WITH NOCTURIA: ICD-10-CM

## 2025-05-23 DIAGNOSIS — Z79.899 MEDICATION MANAGEMENT: ICD-10-CM

## 2025-05-23 DIAGNOSIS — I10 PRIMARY HYPERTENSION: ICD-10-CM

## 2025-05-23 DIAGNOSIS — R35.1 BENIGN PROSTATIC HYPERPLASIA WITH NOCTURIA: ICD-10-CM

## 2025-05-23 DIAGNOSIS — I10 PRIMARY HYPERTENSION: Primary | ICD-10-CM

## 2025-05-23 DIAGNOSIS — I25.10 CORONARY ARTERY DISEASE INVOLVING NATIVE CORONARY ARTERY OF NATIVE HEART WITHOUT ANGINA PECTORIS: ICD-10-CM

## 2025-05-23 LAB
ANION GAP SERPL CALCULATED.3IONS-SCNC: 12 MMOL/L (ref 3–16)
BASOPHILS # BLD: 0 K/UL (ref 0–0.2)
BASOPHILS NFR BLD: 0.3 %
BILIRUBIN, POC: NORMAL
BLOOD URINE, POC: NORMAL
BUN SERPL-MCNC: 18 MG/DL (ref 7–20)
CALCIUM SERPL-MCNC: 9.3 MG/DL (ref 8.3–10.6)
CHLORIDE SERPL-SCNC: 109 MMOL/L (ref 99–110)
CLARITY, POC: CLEAR
CO2 SERPL-SCNC: 21 MMOL/L (ref 21–32)
COLOR, POC: YELLOW
CREAT SERPL-MCNC: 1.1 MG/DL (ref 0.8–1.3)
DEPRECATED RDW RBC AUTO: 17.1 % (ref 12.4–15.4)
EOSINOPHIL # BLD: 0 K/UL (ref 0–0.6)
EOSINOPHIL NFR BLD: 0.5 %
FERRITIN SERPL IA-MCNC: 9.8 NG/ML (ref 30–400)
FOLATE SERPL-MCNC: 7.45 NG/ML (ref 4.78–24.2)
GFR SERPLBLD CREATININE-BSD FMLA CKD-EPI: 66 ML/MIN/{1.73_M2}
GLUCOSE SERPL-MCNC: 129 MG/DL (ref 70–99)
GLUCOSE URINE, POC: NORMAL MG/DL
HCT VFR BLD AUTO: 23.6 % (ref 40.5–52.5)
HGB BLD-MCNC: 7.3 G/DL (ref 13.5–17.5)
IRON SATN MFR SERPL: 7 % (ref 20–50)
IRON SERPL-MCNC: 24 UG/DL (ref 59–158)
KETONES, POC: NORMAL MG/DL
LEUKOCYTE EST, POC: NORMAL
LYMPHOCYTES # BLD: 0.7 K/UL (ref 1–5.1)
LYMPHOCYTES NFR BLD: 10.5 %
MCH RBC QN AUTO: 26.8 PG (ref 26–34)
MCHC RBC AUTO-ENTMCNC: 31 G/DL (ref 31–36)
MCV RBC AUTO: 86.4 FL (ref 80–100)
MONOCYTES # BLD: 0.5 K/UL (ref 0–1.3)
MONOCYTES NFR BLD: 6.6 %
NEUTROPHILS # BLD: 5.6 K/UL (ref 1.7–7.7)
NEUTROPHILS NFR BLD: 82.1 %
NITRITE, POC: NORMAL
PH, POC: 6
PLATELET # BLD AUTO: 245 K/UL (ref 135–450)
PMV BLD AUTO: 9.1 FL (ref 5–10.5)
POTASSIUM SERPL-SCNC: 4.7 MMOL/L (ref 3.5–5.1)
PROTEIN, POC: NORMAL MG/DL
RBC # BLD AUTO: 2.74 M/UL (ref 4.2–5.9)
SODIUM SERPL-SCNC: 142 MMOL/L (ref 136–145)
SPECIFIC GRAVITY, POC: 1.02
TIBC SERPL-MCNC: 328 UG/DL (ref 260–445)
UROBILINOGEN, POC: 1 MG/DL
VIT B12 SERPL-MCNC: 237 PG/ML (ref 211–911)
WBC # BLD AUTO: 6.9 K/UL (ref 4–11)

## 2025-05-23 PROCEDURE — 81002 URINALYSIS NONAUTO W/O SCOPE: CPT | Performed by: NURSE PRACTITIONER

## 2025-05-23 NOTE — PROGRESS NOTES
Blood drawn per physician order. 21 gauge needle used. Location ac space w/o incident.  Pressure applied until bleeding stopped.  Bandaid applied.  Patient instructed to call or return if problems with bleeding.   3 tubes drawn.  And urine sent

## 2025-05-23 NOTE — TELEPHONE ENCOUNTER
He saw Nevaeh recently and it was suggested to repeat colonoscopy and decreased sotalol to 40 mg twice a day due to blood pressure

## 2025-05-23 NOTE — TELEPHONE ENCOUNTER
Called and discussed with patient son, Dick who is on HIPAA.  Due to age and reluctance to complete colonoscopy, and difficulty of obtaining testing, discussed with Dick he can avoid additional testing if he would prefer.      Pending labs today will consider iron infusion or oral iron replacement.  May consider decreasing Eliquis or complete cessation due to anemia for several days.

## 2025-05-23 NOTE — RESULT ENCOUNTER NOTE
Trace amount of blood and protein in urine.  Usually we simply monitor this.  He did have blood in his urine 7 months ago as well.  Was this discussed?

## 2025-05-24 LAB
A BAUMANNII DNA SPEC QL NAA+PROBE: NOT DETECTED
CARBAPENEM RESISTANCE OXA-48 GENE BY PCR: NOT DETECTED
CEPHALOSPORIN RESISTANCE AMPC GENE: NOT DETECTED
ENTEROCOC DNA SPEC QL NAA+PROBE: NOT DETECTED
ESBL RESISTANCE: NOT DETECTED
GP B STREP DNA SPEC QL NAA+PROBE: NOT DETECTED
K PNEUMON DNA SPEC QL NAA+PROBE: NOT DETECTED
MACROLIDE RESISTANCE: NOT DETECTED
METHICILLIN RESISTANCE: NOT DETECTED
MRSA DNA SPEC QL NAA+PROBE: NOT DETECTED
OTHER MICROORG DNA SPEC QL NAA+PROBE: NOT DETECTED
P AERUGINOSA DNA SPEC QL NAA+PROBE: NOT DETECTED
P MIRABILIS DNA SPEC QL NAA+PROBE: NOT DETECTED
QUINOLONE AND FLUOROQUINOLONE RESISTANCE: NOT DETECTED
S PYO RRNA SPEC QL PROBE: NOT DETECTED
TETRACYCLINE RESISTANCE: NOT DETECTED
TRIMETHOPRIM/SULFONAMIDE RESISTANCE: NOT DETECTED

## 2025-05-27 ENCOUNTER — APPOINTMENT (OUTPATIENT)
Dept: CT IMAGING | Age: 85
End: 2025-05-27
Payer: MEDICARE

## 2025-05-27 ENCOUNTER — HOSPITAL ENCOUNTER (EMERGENCY)
Age: 85
Discharge: HOME | End: 2025-05-27
Attending: EMERGENCY MEDICINE
Payer: MEDICARE

## 2025-05-27 VITALS
HEART RATE: 70 BPM | RESPIRATION RATE: 14 BRPM | DIASTOLIC BLOOD PRESSURE: 61 MMHG | TEMPERATURE: 97.5 F | OXYGEN SATURATION: 99 % | SYSTOLIC BLOOD PRESSURE: 152 MMHG

## 2025-05-27 DIAGNOSIS — D64.9 NORMOCYTIC ANEMIA: ICD-10-CM

## 2025-05-27 DIAGNOSIS — M54.50 ACUTE RIGHT-SIDED LOW BACK PAIN, UNSPECIFIED WHETHER SCIATICA PRESENT: Primary | ICD-10-CM

## 2025-05-27 LAB
ANION GAP SERPL CALCULATED.3IONS-SCNC: 14 MMOL/L (ref 3–16)
ANISOCYTOSIS BLD QL SMEAR: ABNORMAL
BASOPHILS # BLD: 0 K/UL (ref 0–0.2)
BASOPHILS NFR BLD: 0 %
BILIRUB UR QL STRIP.AUTO: NEGATIVE
BUN SERPL-MCNC: 28 MG/DL (ref 7–20)
CALCIUM SERPL-MCNC: 8.3 MG/DL (ref 8.3–10.6)
CHLORIDE SERPL-SCNC: 107 MMOL/L (ref 99–110)
CLARITY UR: CLEAR
CO2 SERPL-SCNC: 17 MMOL/L (ref 21–32)
COLOR UR: YELLOW
CREAT SERPL-MCNC: 1.1 MG/DL (ref 0.8–1.3)
DEPRECATED RDW RBC AUTO: 17.5 % (ref 12.4–15.4)
EOSINOPHIL # BLD: 0 K/UL (ref 0–0.6)
EOSINOPHIL NFR BLD: 0 %
GFR SERPLBLD CREATININE-BSD FMLA CKD-EPI: 66 ML/MIN/{1.73_M2}
GLUCOSE SERPL-MCNC: 89 MG/DL (ref 70–99)
GLUCOSE UR STRIP.AUTO-MCNC: NEGATIVE MG/DL
HCT VFR BLD AUTO: 23.6 % (ref 40.5–52.5)
HGB BLD-MCNC: 7.4 G/DL (ref 13.5–17.5)
HGB UR QL STRIP.AUTO: NEGATIVE
KETONES UR STRIP.AUTO-MCNC: NEGATIVE MG/DL
LEUKOCYTE ESTERASE UR QL STRIP.AUTO: NEGATIVE
LYMPHOCYTES # BLD: 0.7 K/UL (ref 1–5.1)
LYMPHOCYTES NFR BLD: 8 %
MCH RBC QN AUTO: 27.2 PG (ref 26–34)
MCHC RBC AUTO-ENTMCNC: 31.4 G/DL (ref 31–36)
MCV RBC AUTO: 86.5 FL (ref 80–100)
MONOCYTES # BLD: 0.8 K/UL (ref 0–1.3)
MONOCYTES NFR BLD: 9 %
MYELOCYTES NFR BLD MANUAL: 3 %
NEUTROPHILS # BLD: 7.6 K/UL (ref 1.7–7.7)
NEUTROPHILS NFR BLD: 80 %
NITRITE UR QL STRIP.AUTO: NEGATIVE
NRBC BLD-RTO: 1 /100 WBC
PH UR STRIP.AUTO: 6 [PH] (ref 5–8)
PLATELET # BLD AUTO: 291 K/UL (ref 135–450)
PLATELET BLD QL SMEAR: ADEQUATE
PMV BLD AUTO: 8.3 FL (ref 5–10.5)
POLYCHROMASIA BLD QL SMEAR: ABNORMAL
POTASSIUM SERPL-SCNC: 4.3 MMOL/L (ref 3.5–5.1)
PROT UR STRIP.AUTO-MCNC: NEGATIVE MG/DL
RBC # BLD AUTO: 2.73 M/UL (ref 4.2–5.9)
SLIDE REVIEW: ABNORMAL
SODIUM SERPL-SCNC: 138 MMOL/L (ref 136–145)
SP GR UR STRIP.AUTO: 1.01 (ref 1–1.03)
UA COMPLETE W REFLEX CULTURE PNL UR: NORMAL
UA DIPSTICK W REFLEX MICRO PNL UR: NORMAL
URN SPEC COLLECT METH UR: NORMAL
UROBILINOGEN UR STRIP-ACNC: 0.2 E.U./DL
WBC # BLD AUTO: 9.1 K/UL (ref 4–11)

## 2025-05-27 PROCEDURE — 72131 CT LUMBAR SPINE W/O DYE: CPT

## 2025-05-27 PROCEDURE — 96374 THER/PROPH/DIAG INJ IV PUSH: CPT

## 2025-05-27 PROCEDURE — 99284 EMERGENCY DEPT VISIT MOD MDM: CPT

## 2025-05-27 PROCEDURE — 80048 BASIC METABOLIC PNL TOTAL CA: CPT

## 2025-05-27 PROCEDURE — 96375 TX/PRO/DX INJ NEW DRUG ADDON: CPT

## 2025-05-27 PROCEDURE — 6370000000 HC RX 637 (ALT 250 FOR IP): Performed by: EMERGENCY MEDICINE

## 2025-05-27 PROCEDURE — 6360000002 HC RX W HCPCS: Performed by: EMERGENCY MEDICINE

## 2025-05-27 PROCEDURE — 81003 URINALYSIS AUTO W/O SCOPE: CPT

## 2025-05-27 PROCEDURE — 72192 CT PELVIS W/O DYE: CPT

## 2025-05-27 PROCEDURE — 36415 COLL VENOUS BLD VENIPUNCTURE: CPT

## 2025-05-27 PROCEDURE — 85025 COMPLETE CBC W/AUTO DIFF WBC: CPT

## 2025-05-27 RX ORDER — KETOROLAC TROMETHAMINE 15 MG/ML
15 INJECTION, SOLUTION INTRAMUSCULAR; INTRAVENOUS ONCE
Status: COMPLETED | OUTPATIENT
Start: 2025-05-27 | End: 2025-05-27

## 2025-05-27 RX ORDER — MORPHINE SULFATE 4 MG/ML
4 INJECTION, SOLUTION INTRAMUSCULAR; INTRAVENOUS ONCE
Refills: 0 | Status: COMPLETED | OUTPATIENT
Start: 2025-05-27 | End: 2025-05-27

## 2025-05-27 RX ORDER — LIDOCAINE 4 G/G
1 PATCH TOPICAL ONCE
Status: DISCONTINUED | OUTPATIENT
Start: 2025-05-27 | End: 2025-05-27 | Stop reason: HOSPADM

## 2025-05-27 RX ORDER — PREDNISONE 50 MG/1
50 TABLET ORAL DAILY
Qty: 5 TABLET | Refills: 0 | Status: SHIPPED | OUTPATIENT
Start: 2025-05-27 | End: 2025-06-01

## 2025-05-27 RX ORDER — METHOCARBAMOL 500 MG/1
1500 TABLET, FILM COATED ORAL ONCE
Status: COMPLETED | OUTPATIENT
Start: 2025-05-27 | End: 2025-05-27

## 2025-05-27 RX ORDER — PREDNISONE 20 MG/1
60 TABLET ORAL ONCE
Status: COMPLETED | OUTPATIENT
Start: 2025-05-27 | End: 2025-05-27

## 2025-05-27 RX ORDER — LIDOCAINE 50 MG/G
1 PATCH TOPICAL DAILY
Qty: 10 PATCH | Refills: 0 | Status: SHIPPED | OUTPATIENT
Start: 2025-05-27 | End: 2025-06-06

## 2025-05-27 RX ORDER — OXYCODONE AND ACETAMINOPHEN 5; 325 MG/1; MG/1
1 TABLET ORAL EVERY 6 HOURS PRN
Qty: 8 TABLET | Refills: 0 | Status: SHIPPED | OUTPATIENT
Start: 2025-05-27 | End: 2025-05-30

## 2025-05-27 RX ADMIN — MORPHINE SULFATE 4 MG: 4 INJECTION, SOLUTION INTRAMUSCULAR; INTRAVENOUS at 09:58

## 2025-05-27 RX ADMIN — PREDNISONE 60 MG: 20 TABLET ORAL at 12:11

## 2025-05-27 RX ADMIN — KETOROLAC TROMETHAMINE 15 MG: 15 INJECTION, SOLUTION INTRAMUSCULAR; INTRAVENOUS at 09:57

## 2025-05-27 RX ADMIN — METHOCARBAMOL TABLETS 1500 MG: 500 TABLET, COATED ORAL at 09:57

## 2025-05-27 ASSESSMENT — PAIN DESCRIPTION - LOCATION
LOCATION: BACK
LOCATION: BACK

## 2025-05-27 ASSESSMENT — PAIN DESCRIPTION - ORIENTATION: ORIENTATION: RIGHT

## 2025-05-27 ASSESSMENT — PAIN SCALES - GENERAL
PAINLEVEL_OUTOF10: 6
PAINLEVEL_OUTOF10: 5

## 2025-05-27 NOTE — RESULT ENCOUNTER NOTE
Yes go to ER this morning.  He hadn't had a bowel movement when I saw him the other morning.  If he still has not had one then he needs to go.  We needed to recheck his blood counts anyway after stopping the Eliquis on Friday.  And if he his pain is worse than when I saw him he should go for some imaging.      Let me know how I can help here.  I'll try to follow up once I get the report.

## 2025-05-27 NOTE — ED PROVIDER NOTES
0845 05/27/25 0840  methocarbamol (ROBAXIN) tablet 1,500 mg  ONCE         Last MAR action: Given - by LORRAINE ABRAHAM on 05/27/25 at 0957 LILIBETH ANDREWS    05/27/25 0845 05/27/25 0840  ketorolac (TORADOL) injection 15 mg  ONCE         Last MAR action: Given - by LORRAINE ABRAHAM on 05/27/25 at 0957 LILIBETH ANDREWS                Radiology  CT LUMBAR SPINE WO CONTRAST  Result Date: 5/27/2025  EXAMINATION: CT OF THE LUMBAR SPINE WITHOUT CONTRAST  5/27/2025 TECHNIQUE: CT of the lumbar spine was performed without the administration of intravenous contrast. Multiplanar reformatted images are provided for review. Adjustment of mA and/or kV according to patient size was utilized.  Automated exposure control, iterative reconstruction, and/or weight based adjustment of the mA/kV was utilized to reduce the radiation dose to as low as reasonably achievable. COMPARISON: None HISTORY: ORDERING SYSTEM PROVIDED HISTORY: back pain TECHNOLOGIST PROVIDED HISTORY: Reason for exam:->back pain Decision Support Exception - unselect if not a suspected or confirmed emergency medical condition->Emergency Medical Condition (MA) Reason for Exam: back and right side hip pain FINDINGS: BONES/ALIGNMENT: There is grade 1 anterolisthesis of L4 relative to L3 and L5.  There is no acute fracture or osseous destruction. DEGENERATIVE CHANGES: Moderate disc space narrowing, endplate osteophyte formation and vacuum phenomena is present at L3/L4. SOFT TISSUES/RETROPERITONEUM: No paraspinal mass is seen.     Degenerative changes without acute abnormality.     CT PELVIS WO CONTRAST Additional Contrast? None  Result Date: 5/27/2025  EXAMINATION: CT OF THE PELVIS WITHOUT CONTRAST 5/27/2025 10:39 am TECHNIQUE: CT of the pelvis was performed without the administration of intravenous contrast.  Multiplanar reformatted images are provided for review. Adjustment of mA and/or kV according to patient size was utilized.  Automated exposure

## 2025-05-28 ENCOUNTER — CARE COORDINATION (OUTPATIENT)
Dept: CARE COORDINATION | Age: 85
End: 2025-05-28

## 2025-05-28 ENCOUNTER — PATIENT MESSAGE (OUTPATIENT)
Dept: FAMILY MEDICINE CLINIC | Age: 85
End: 2025-05-28

## 2025-05-28 ENCOUNTER — TELEPHONE (OUTPATIENT)
Dept: FAMILY MEDICINE CLINIC | Age: 85
End: 2025-05-28

## 2025-05-28 DIAGNOSIS — M54.50 ACUTE RIGHT-SIDED LOW BACK PAIN WITHOUT SCIATICA: Primary | ICD-10-CM

## 2025-05-28 DIAGNOSIS — D64.9 SYMPTOMATIC ANEMIA: Primary | ICD-10-CM

## 2025-05-28 DIAGNOSIS — K90.9 IRON MALABSORPTION: ICD-10-CM

## 2025-05-28 DIAGNOSIS — D64.9 ANEMIA, UNSPECIFIED TYPE: ICD-10-CM

## 2025-05-28 DIAGNOSIS — I48.0 PAROXYSMAL ATRIAL FIBRILLATION (HCC): Primary | ICD-10-CM

## 2025-05-28 DIAGNOSIS — D50.9 IRON DEFICIENCY ANEMIA, UNSPECIFIED IRON DEFICIENCY ANEMIA TYPE: ICD-10-CM

## 2025-05-28 DIAGNOSIS — D50.9 IRON DEFICIENCY ANEMIA, UNSPECIFIED IRON DEFICIENCY ANEMIA TYPE: Primary | ICD-10-CM

## 2025-05-28 RX ORDER — GABAPENTIN 100 MG/1
100 CAPSULE ORAL 2 TIMES DAILY
Qty: 20 CAPSULE | Refills: 0 | Status: SHIPPED | OUTPATIENT
Start: 2025-05-28 | End: 2025-06-07

## 2025-05-28 NOTE — PROGRESS NOTES
Pt is having symptomatic anemia with dizziness and shortness of breath.  Last Hg 7.4, Hct 23.6.  Eliquis discontinued 5/23/25 due to symptomatic anemia and negative colonoscopy despite poor prep.  Pt would benefit from IV Venofer x 2 doses.  Patient and son are agreeable to procedure and understands it will be done at [facility].  Son is patient primary transport and will make sure patient makes it to Iron replacement appointment.      Elvia can you please pend the IV Venofer and send to Dr. Lebron?  Thanks    Admission on 05/27/2025, Discharged on 05/27/2025   Component Date Value Ref Range Status    WBC 05/27/2025 9.1  4.0 - 11.0 K/uL Final    RBC 05/27/2025 2.73 (L)  4.20 - 5.90 M/uL Final    Hemoglobin 05/27/2025 7.4 (L)  13.5 - 17.5 g/dL Final    Hematocrit 05/27/2025 23.6 (L)  40.5 - 52.5 % Final    MCV 05/27/2025 86.5  80.0 - 100.0 fL Final    MCH 05/27/2025 27.2  26.0 - 34.0 pg Final    MCHC 05/27/2025 31.4  31.0 - 36.0 g/dL Final    RDW 05/27/2025 17.5 (H)  12.4 - 15.4 % Final    Platelets 05/27/2025 291  135 - 450 K/uL Final    MPV 05/27/2025 8.3  5.0 - 10.5 fL Final    PLATELET SLIDE REVIEW 05/27/2025 Adequate   Final    SLIDE REVIEW 05/27/2025 see below   Final    Neutrophils % 05/27/2025 80.0  % Final    Lymphocytes % 05/27/2025 8.0  % Final    Monocytes % 05/27/2025 9.0  % Final    Eosinophils % 05/27/2025 0.0  % Final    Basophils % 05/27/2025 0.0  % Final    Neutrophils Absolute 05/27/2025 7.6  1.7 - 7.7 K/uL Final    Lymphocytes Absolute 05/27/2025 0.7 (L)  1.0 - 5.1 K/uL Final    Monocytes Absolute 05/27/2025 0.8  0.0 - 1.3 K/uL Final    Eosinophils Absolute 05/27/2025 0.0  0.0 - 0.6 K/uL Final    Basophils Absolute 05/27/2025 0.0  0.0 - 0.2 K/uL Final    Myelocyte Percent 05/27/2025 3 (A)  % Final    nRBC 05/27/2025 1 (A)  /100 WBC Final    Anisocytosis 05/27/2025 1+ (A)   Final    Polychromasia 05/27/2025 1+ (A)   Final    Sodium 05/27/2025 138  136 - 145 mmol/L Final    Potassium reflex

## 2025-05-28 NOTE — PROGRESS NOTES
I was not sure what dose Dr Lebron wanted to do and how many weeks, you can make adjustments to the dose if needed  Please fax to Michelle at [facility] [phone]

## 2025-05-28 NOTE — TELEPHONE ENCOUNTER
Called son Dick, HIPAA.  Continues to have severe right sided back pain despite Percocet.  Will do trial of Gabapenting 100 BID for now.  May cautiously take with Percocet.      Also discussed with patient IV iron replacement order has been placed.  Will be contacted if authorized by insurance.      Discussed anticoagulation.  As patient currently holding Eliquis patient is at greater risk for a CVA/ MI due to nonvalvular afib.  Prior colonoscopy negative but had poor prep.  Pt reluctant to complete addition GI testing for eGD or repeat colonoscopy.  Discussed possible EP referral for WatchMan device.  Son asked for info to be sent through [employer].

## 2025-05-30 ENCOUNTER — CARE COORDINATION (OUTPATIENT)
Dept: CARE COORDINATION | Age: 85
End: 2025-05-30

## 2025-05-30 NOTE — CARE COORDINATION
Ambulatory Care Coordination Note     5/30/2025      Patient outreach attempt by this ACM today to offer care management services. ACM was unable to reach the patient by telephone today;   No answer     ACM: Odalys Barboza, RN     PCP/Specialist follow up:   Future Appointments         Provider Specialty Dept Phone    6/6/2025 10:00 AM [facility] NURSING ROOM 1 IP Unit [phone]    6/13/2025 11:30 AM [facility]SING RO[facility] 1 IP Unit [phone]    6/19/2025 9:20 AM Nevaeh Mckeon APRN - CNP Family Medicine [phone]    6/20/2025 10:30 AM [facility] RO[facility] 1 IP Unit [phone]    6/27/2025 10:30 AM [facility] RO[facility] 1 IP Unit [phone]    7/3/2025 11:00 AM [facility]URSING RO[facility] 1 IP Unit [phone]    9/22/2025 10:00 AM Kee Ireland, DO Cardiology [phone]

## 2025-05-30 NOTE — DISCHARGE INSTRUCTIONS
What is this medicine?    IRON SUCROSE (JOSHUA tobin) is an iron complex. Iron is used to make healthy red blood cells, which carry oxygen and nutrients throughout the body. This medicine is used to treat iron deficiency anemia in people with chronic kidney disease.  This medicine may be used for other purposes; ask your health care provider or pharmacist if you have questions.     COMMON BRAND NAME(S): Venofer     What should I tell my health care provider before I take this medicine?    They need to know if you have any of these conditions:  -anemia not caused by low iron levels  -heart disease  -high levels of iron in the blood  -kidney disease  -liver disease  -an unusual or allergic reaction to iron, other medicines, foods, dyes, or preservatives  -pregnant or trying to get pregnant  -breast-feeding     How should I use this medicine?    This medicine is for infusion into a vein. It is given by a health care professional in a hospital or clinic setting.     What if I miss a dose?    It is important not to miss your dose. Call your doctor or health care professional if you are unable to keep an appointment.     What may interact with this medicine?    Do not take this medicine with any of the following medications:  -deferoxamine  -dimercaprol  -other iron products     This medicine may also interact with the following medications:  -chloramphenicol  -deferasirox     This list may not describe all possible interactions. Give your health care provider a list of all the medicines, herbs, non-prescription drugs, or dietary supplements you use. Also tell them if you smoke, drink alcohol, or use illegal drugs. Some items may interact with your medicine.     What should I watch for while using this medicine?    Visit your doctor or healthcare professional regularly. Tell your doctor or healthcare professional if your symptoms do not start to get better or if they get worse. You may need blood work done  while you are taking this medicine.     You may need to follow a special diet. Talk to your doctor. Foods that contain iron include: whole grains/cereals, dried fruits, beans, or peas, leafy green vegetables, and organ meats (liver, kidney).     What side effects may I notice from receiving this medicine?    Side effects that you should report to your doctor or health care professional as soon as possible:  -allergic reactions like skin rash, itching or hives, swelling of the face, lips, or tongue  -breathing problems  -changes in blood pressure  -cough  -fast, irregular heartbeat  -feeling faint or lightheaded, falls  -fever or chills  -flushing, sweating, or hot feelings  -joint or muscle aches/pains  -seizures  -swelling of the ankles or feet  -unusually weak or tired     Side effects that usually do not require medical attention (report to your doctor or health care professional if they continue or are bothersome):  -diarrhea  -feeling achy  -headache  -irritation at site where injected  -nausea, vomiting  -stomach upset  -tiredness    RETURN JUNE 13,2025 AT 1130 FOR VENOFER INFUSION    FOLLOW UP WITH DR. BARRETT AS NEEDED OR SCHEDULED    CALL US IF YOU NEED TO RESCHEDULE YOUR APPOINTMENT  [phone]

## 2025-05-30 NOTE — CARE COORDINATION
Ambulatory Care Coordination Note     5/30/2025      Patient outreach attempt by this ACM today to offer care management services. ACM was unable to reach the patient by telephone today;   Unable to reach patient     ACM: Odalys Barboza, SILKE     PCP/Specialist follow up:   Future Appointments         Provider Specialty Dept Phone    6/6/2025 10:00 AM [facility] 1 IP Unit [phone]    6/13/2025 11:30 AM [facility] RO[facility] 1 IP Unit [phone]    6/19/2025 9:20 AM Nevaeh Mckeon, APRN - CNP Family Medicine [phone]    6/20/2025 10:30 AM [facility] 1 IP Unit [phone]    6/27/2025 10:30 AM [facility] 1 IP Unit [phone]    7/3/2025 11:00 AM [facility]URSING RO[facility] 1 IP Unit [phone]    9/22/2025 10:00 AM Kee Ireland, DO Cardiology [phone]

## 2025-06-06 ENCOUNTER — HOSPITAL ENCOUNTER (OUTPATIENT)
Dept: NURSING | Age: 85
Setting detail: INFUSION SERIES
Discharge: HOME | End: 2025-06-06
Payer: MEDICARE

## 2025-06-06 VITALS
SYSTOLIC BLOOD PRESSURE: 118 MMHG | RESPIRATION RATE: 16 BRPM | DIASTOLIC BLOOD PRESSURE: 64 MMHG | TEMPERATURE: 97.9 F | HEART RATE: 57 BPM

## 2025-06-06 DIAGNOSIS — D50.9 IRON DEFICIENCY ANEMIA, UNSPECIFIED IRON DEFICIENCY ANEMIA TYPE: ICD-10-CM

## 2025-06-06 DIAGNOSIS — D64.9 ANEMIA, UNSPECIFIED TYPE: Primary | ICD-10-CM

## 2025-06-06 DIAGNOSIS — K90.9 IRON MALABSORPTION: ICD-10-CM

## 2025-06-06 PROCEDURE — 96365 THER/PROPH/DIAG IV INF INIT: CPT

## 2025-06-06 PROCEDURE — 99211 OFF/OP EST MAY X REQ PHY/QHP: CPT

## 2025-06-06 PROCEDURE — 6360000002 HC RX W HCPCS: Performed by: FAMILY MEDICINE

## 2025-06-06 PROCEDURE — 2580000003 HC RX 258: Performed by: FAMILY MEDICINE

## 2025-06-06 PROCEDURE — 96367 TX/PROPH/DG ADDL SEQ IV INF: CPT

## 2025-06-06 RX ADMIN — IRON SUCROSE 200 MG: 20 INJECTION, SOLUTION INTRAVENOUS at 09:50

## 2025-06-06 ASSESSMENT — PAIN - FUNCTIONAL ASSESSMENT: PAIN_FUNCTIONAL_ASSESSMENT: 0-10

## 2025-06-06 ASSESSMENT — PAIN SCALES - GENERAL: PAINLEVEL_OUTOF10: 6

## 2025-06-06 ASSESSMENT — PAIN DESCRIPTION - DESCRIPTORS: DESCRIPTORS: ACHING

## 2025-06-06 NOTE — PLAN OF CARE
IV INFUSION / INJECTION CARE PLAN    HEMODYNAMIC STATUS  INTERDISCIPLINARY   Goal: Hemodynamic Baseline Staus Maintained / Procedure Completed  Interventions     1. Obtain Patient  Medical /  Surgical History     1 Assess & Review Allergies Prior to IV Infusion or Injection & All  Meds (PRN)     2. Assess Patient  & Obtain  Vital Signs / LOC upon   admission and (PRN)     3.    Start IV as appropriate for Procedure & check Patency (PRN)   NURSING   SAFETY & PSYCHO SOCIAL  INTERDISCIPLINARY   Goal: Patient Returns to Baseline Activity  Interventions     1.  Greet Patient with ID Badge/ Picture in View (PRN)     2. Be Available & Sensitive to Patient’s Needs (PRN)     3.  Communicate referral to Pastoral Care as Appropriate (PRN)     4.  Provide Age Specific Measures (PRN)     4.  Admission Data Base Reviewed     5.  Administer Meds Per Orders (PRN)     5.  Maintain Baseline Activity  Or Activity as Ordered Per Physician     NUTRITION   INTERDISCIPLINARY   Goal: Patient to baseline/ Improved Nutrition  Interventions     1.  Assess Nutritional Status (PRN)       LAB & DIAGNOSTICS   Goal: Additional Tests per Physicians   Orders  Interventions     1.  Lab & Diagnostics per Physician Orders (PRN)     2.  Assess Lab Time Out  for  Patient Safety as Needed (PRN)     RESPIRATORY  INTERDISCIPLINARY   Goal: Airway   Baseline Status Maintained   Interventions     1. Evaluate Bilateral Breath Sounds  Baseline, (PRN),      2. Weight & Height Noted ( PRN)     3.  Assess Baseline SPo2 (PRN)      KNOWLEDGE DEFICIT, EDUCATION, DISCHARGE PLAN   INTERDISCIPLINARY    Patient / SO verbalize Understanding Of Procedural discharge Instructions  Interventions     1.   Obtain Informed Consent (PRN)      2.  Initiate IV Infusion / Injection Plan of Care, Answer Questions (PRN)       3. Assess Patient’s Ability to Understand Information (PRN)     3.   Discharge Planning ; Assure  Presence of   upon Patient Discharge when indicated     4.  Received fax from [facility] requesting clarification on pt's Lisinopril, as #60 was sent in. I faxed back stating it should be once daily #30.     Education / Communication  Ongoing As Appropriate      5. Keep Patients / Families Aware of Delays As Appropriate     6. Reinforce Discharge Teaching / Post  Procedure  Instructions (PRN)          PAIN MANAGEMENT  INTERDISCIPLINARY   Goal:Patient Return to Pre Procedure Comfort  Interventions     1.  Assess Baseline  Pain Level  and (PRN)     2.  Intra Procedure ;  Evaluation & Assessment Of  Pain  is Ongoing     3. Post procedure; Assess Pain Level Once Awake/ Prior  To Discharge     2.   Administer Analgesics as Ordered (PRN)      3.  Assess Effectiveness of Pain Management (PRN)       Re-Assess Patient   after all Interventions.   Assess Pain Level 30 - 60 Minutes After Pain Management Intervention.     4. Provide Discharge Teaching      Detail Level: Simple

## 2025-06-06 NOTE — PROGRESS NOTES
Pt discharged to home in stable condition.  Verbal and written discharge instructions given pt verbalized understanding.  Pt ambulatory to car with son

## 2025-06-13 ENCOUNTER — HOSPITAL ENCOUNTER (OUTPATIENT)
Dept: NURSING | Age: 85
Setting detail: INFUSION SERIES
Discharge: HOME | End: 2025-06-13
Payer: MEDICARE

## 2025-06-13 VITALS
WEIGHT: 232 LBS | HEART RATE: 62 BPM | HEIGHT: 70 IN | RESPIRATION RATE: 16 BRPM | TEMPERATURE: 98.3 F | BODY MASS INDEX: 33.21 KG/M2 | SYSTOLIC BLOOD PRESSURE: 131 MMHG | DIASTOLIC BLOOD PRESSURE: 68 MMHG

## 2025-06-13 DIAGNOSIS — K90.9 IRON MALABSORPTION: ICD-10-CM

## 2025-06-13 DIAGNOSIS — D64.9 ANEMIA, UNSPECIFIED TYPE: Primary | ICD-10-CM

## 2025-06-13 DIAGNOSIS — D50.9 IRON DEFICIENCY ANEMIA, UNSPECIFIED IRON DEFICIENCY ANEMIA TYPE: ICD-10-CM

## 2025-06-13 PROCEDURE — 2580000003 HC RX 258: Performed by: FAMILY MEDICINE

## 2025-06-13 PROCEDURE — 6360000002 HC RX W HCPCS: Performed by: FAMILY MEDICINE

## 2025-06-13 PROCEDURE — 99211 OFF/OP EST MAY X REQ PHY/QHP: CPT

## 2025-06-13 PROCEDURE — 96365 THER/PROPH/DIAG IV INF INIT: CPT

## 2025-06-13 RX ADMIN — IRON SUCROSE 200 MG: 20 INJECTION, SOLUTION INTRAVENOUS at 11:45

## 2025-06-13 ASSESSMENT — PAIN DESCRIPTION - DESCRIPTORS: DESCRIPTORS: ACHING

## 2025-06-13 ASSESSMENT — PAIN SCALES - GENERAL: PAINLEVEL_OUTOF10: 4

## 2025-06-13 ASSESSMENT — PAIN DESCRIPTION - LOCATION: LOCATION: BACK

## 2025-06-13 ASSESSMENT — PAIN DESCRIPTION - ONSET: ONSET: ON-GOING

## 2025-06-13 ASSESSMENT — PAIN DESCRIPTION - PAIN TYPE: TYPE: CHRONIC PAIN

## 2025-06-13 ASSESSMENT — PAIN DESCRIPTION - FREQUENCY: FREQUENCY: CONTINUOUS

## 2025-06-13 NOTE — PROGRESS NOTES
Venofer infused without any signs of adverse reactions  IV tubing was removed from pt and he was informed that I will remove INT closer to time of discharge  Pt stated ok No changes noted  Will monitor

## 2025-06-13 NOTE — PROGRESS NOTES
No changes noted  Pt reports that he feels fine  INT was removed without difficulty  Cath tip intact  Pressure then pressure dressing was applied and secured with a coban dressing  IV site unremarkable  Pt gus well  Discharge instructions reviewed with pt and understanding verbalized  Copy was given  Pt was discharged ambulatory in stable condition

## 2025-06-13 NOTE — PROGRESS NOTES
Pt here ambulatory using his cane  for a Venofer infusion  Pt reporting back discomfort rated a 4 out of 10 today Pt denies any questions or concerns with the infusion today as he had an infusion last week  Pt reports he had a metallic taste at time and he reports he was told to continue his oral iron tablets at the office even tho he is getting IV iron infusions   IV #24 was started in his Rt hand on my first attempt   Pt gus well  Venofer 200 mg IVPB was started  IV site unremarkable  Will monitor

## 2025-06-13 NOTE — DISCHARGE INSTRUCTIONS
What is this medicine?    IRON SUCROSE (JOSHUA tobin) is an iron complex. Iron is used to make healthy red blood cells, which carry oxygen and nutrients throughout the body. This medicine is used to treat iron deficiency anemia in people with chronic kidney disease.  This medicine may be used for other purposes; ask your health care provider or pharmacist if you have questions.     COMMON BRAND NAME(S): Venofer     What should I tell my health care provider before I take this medicine?    They need to know if you have any of these conditions:  -anemia not caused by low iron levels  -heart disease  -high levels of iron in the blood  -kidney disease  -liver disease  -an unusual or allergic reaction to iron, other medicines, foods, dyes, or preservatives  -pregnant or trying to get pregnant  -breast-feeding     How should I use this medicine?    This medicine is for infusion into a vein. It is given by a health care professional in a hospital or clinic setting.     What if I miss a dose?    It is important not to miss your dose. Call your doctor or health care professional if you are unable to keep an appointment.     What may interact with this medicine?    Do not take this medicine with any of the following medications:  -deferoxamine  -dimercaprol  -other iron products     This medicine may also interact with the following medications:  -chloramphenicol  -deferasirox     This list may not describe all possible interactions. Give your health care provider a list of all the medicines, herbs, non-prescription drugs, or dietary supplements you use. Also tell them if you smoke, drink alcohol, or use illegal drugs. Some items may interact with your medicine.     What should I watch for while using this medicine?    Visit your doctor or healthcare professional regularly. Tell your doctor or healthcare professional if your symptoms do not start to get better or if they get worse. You may need blood work done

## 2025-06-19 ENCOUNTER — OFFICE VISIT (OUTPATIENT)
Dept: FAMILY MEDICINE CLINIC | Age: 85
End: 2025-06-19
Payer: MEDICARE

## 2025-06-19 ENCOUNTER — TELEPHONE (OUTPATIENT)
Dept: CARDIOLOGY CLINIC | Age: 85
End: 2025-06-19

## 2025-06-19 VITALS
SYSTOLIC BLOOD PRESSURE: 130 MMHG | WEIGHT: 216 LBS | OXYGEN SATURATION: 97 % | TEMPERATURE: 97.8 F | DIASTOLIC BLOOD PRESSURE: 60 MMHG | BODY MASS INDEX: 31.41 KG/M2 | HEART RATE: 61 BPM

## 2025-06-19 DIAGNOSIS — R55 SYNCOPE, UNSPECIFIED SYNCOPE TYPE: ICD-10-CM

## 2025-06-19 DIAGNOSIS — D64.9 ANEMIA, UNSPECIFIED TYPE: ICD-10-CM

## 2025-06-19 DIAGNOSIS — R63.4 RECENT UNINTENTIONAL WEIGHT LOSS OVER SEVERAL MONTHS: ICD-10-CM

## 2025-06-19 DIAGNOSIS — R42 DIZZINESS: ICD-10-CM

## 2025-06-19 DIAGNOSIS — I48.0 PAROXYSMAL ATRIAL FIBRILLATION (HCC): Primary | ICD-10-CM

## 2025-06-19 DIAGNOSIS — R06.02 SHORTNESS OF BREATH: ICD-10-CM

## 2025-06-19 PROCEDURE — 3078F DIAST BP <80 MM HG: CPT | Performed by: NURSE PRACTITIONER

## 2025-06-19 PROCEDURE — 1160F RVW MEDS BY RX/DR IN RCRD: CPT | Performed by: NURSE PRACTITIONER

## 2025-06-19 PROCEDURE — 1123F ACP DISCUSS/DSCN MKR DOCD: CPT | Performed by: NURSE PRACTITIONER

## 2025-06-19 PROCEDURE — 3075F SYST BP GE 130 - 139MM HG: CPT | Performed by: NURSE PRACTITIONER

## 2025-06-19 PROCEDURE — 99214 OFFICE O/P EST MOD 30 MIN: CPT | Performed by: NURSE PRACTITIONER

## 2025-06-19 PROCEDURE — 1159F MED LIST DOCD IN RCRD: CPT | Performed by: NURSE PRACTITIONER

## 2025-06-19 NOTE — PROGRESS NOTES
6/19/2025    Chief Complaint   Patient presents with    Back Pain     Doing a little better but can not stand for any longer     Anemia    Shortness of Breath       Saw Lane is a 85 y.o. male, presents today for:      ASSESSMENT/PLAN:  1. Paroxysmal atrial fibrillation (HCC)  Will order FAMILIA and ECHO to determine afib burden and heart structure.  Will attempt to call Afib clinic for soonest available appt for Watchman device evaluation as patient has been off NOAC for 4 weeks.  Unclear afib burden, no recent Cardiac event monitor.  No recent ECHO/ stress testing.  Last ECG 2/20/25 showing SR with occasional PVCs.  Continue Sotalol 40 mg BID for now while awaiting Cardio recommendations.  Chads Vasc Score 3 (Age, HTN)  - Cardiac event/MCOT monitor; Future  - Echo (TTE) complete (PRN contrast/bubble/strain/3D); Future  - TSH reflex to FT4,FT3 ([place] Only); Future    2. Recent unintentional weight loss over several months  Unclear cause, ~15 lbs unintentional weight loss since May.  Colonoscopy 5/5/25 negative but had poor bowel prep.  Pt previously reluctant to obtain additional procedures but is now agreeable if needed.  Will pursue afib evaluation for now and re-evaluate next OV for additional testing.  No recent CT C/A/P, may consider pending labs in 4 weeks.      3. Anemia, unspecified type  Continue IV Venofer.  Pt symptomatically improved with continued mild shortness of breath.  Discussed expected course for anemia.  May hold oral iron for now    4. Dizziness  Improved since last OV but still occurring.    - CBC; Future  - Basic Metabolic Panel; Future  - Cardiac event/MCOT monitor; Future  - TSH reflex to FT4,FT3 ([place] Only); Future    5. Shortness of breath  Improved today, continue to monitor  - Cardiac event/MCOT monitor; Future  - Echo (TTE) complete (PRN contrast/bubble/strain/3D); Future    6. Syncope, unspecified syncope type  See above.   - Cardiac event/MCOT monitor; Future  - Echo

## 2025-06-19 NOTE — PATIENT INSTRUCTIONS
Deajennifer peralta,    Thank you for coming in.  We appreciate your time and effort in helping us with your care.  Here are some follow up items following our discussion:    We will call the Afib clinic and see if they can see you sooner than September.  We will ask for them to call you to make an appointment.    Please schedule Heart Monitor and Heart Ultrasound.  The heart monitor will tell us how often you are having an irregular rhythm.  The Heart ultrasound will tell us how well the heart is functioning and if there is a concern for a sleep related issue  Please schedule labs for 1 month.  You do not need to be fasting for this.    Pending labs in a couple a months and heart work up we will rediscuss if we need to go back to see GI.    Please watch food intake over next several months and your weight.  If you lose more weight please call Nevaeh  We will rediscuss your back pain next time.  Let Nevae[facility] know if you would like [facility]    Please compare this printed medication list with your medications at home to be sure they are the same.  If you have any medications that are different please contact us immediately at [phone].  Or you can send us a [employer] message.      If you need to schedule imaging or testing you can call [facility]'s Main Scheduling Line at [phone], option 2    Also review your allergies that we have listed, these may also include medications that you have not been able to tolerate, make sure everything listed is correct. If you have any allergies that are different please contact us immediately at [phone].    You may receive a survey in the mail or by email asking about your experience during your visit today. Please complete and return to us so we know how we are serving you.

## 2025-06-20 ENCOUNTER — HOSPITAL ENCOUNTER (OUTPATIENT)
Dept: NURSING | Age: 85
Setting detail: INFUSION SERIES
Discharge: HOME | End: 2025-06-20
Payer: MEDICARE

## 2025-06-20 VITALS
WEIGHT: 216.05 LBS | DIASTOLIC BLOOD PRESSURE: 61 MMHG | SYSTOLIC BLOOD PRESSURE: 109 MMHG | RESPIRATION RATE: 16 BRPM | HEIGHT: 70 IN | TEMPERATURE: 98 F | HEART RATE: 68 BPM | BODY MASS INDEX: 30.93 KG/M2

## 2025-06-20 DIAGNOSIS — K90.9 IRON MALABSORPTION: ICD-10-CM

## 2025-06-20 DIAGNOSIS — D64.9 ANEMIA, UNSPECIFIED TYPE: Primary | ICD-10-CM

## 2025-06-20 DIAGNOSIS — D50.9 IRON DEFICIENCY ANEMIA, UNSPECIFIED IRON DEFICIENCY ANEMIA TYPE: ICD-10-CM

## 2025-06-20 PROCEDURE — 96365 THER/PROPH/DIAG IV INF INIT: CPT

## 2025-06-20 PROCEDURE — 99211 OFF/OP EST MAY X REQ PHY/QHP: CPT

## 2025-06-20 PROCEDURE — 2580000003 HC RX 258: Performed by: FAMILY MEDICINE

## 2025-06-20 PROCEDURE — 6360000002 HC RX W HCPCS: Performed by: FAMILY MEDICINE

## 2025-06-20 RX ADMIN — IRON SUCROSE 200 MG: 20 INJECTION, SOLUTION INTRAVENOUS at 10:28

## 2025-06-20 ASSESSMENT — PAIN DESCRIPTION - PAIN TYPE: TYPE: CHRONIC PAIN

## 2025-06-20 ASSESSMENT — PAIN SCALES - GENERAL: PAINLEVEL_OUTOF10: 3

## 2025-06-20 ASSESSMENT — PAIN DESCRIPTION - ONSET: ONSET: ON-GOING

## 2025-06-20 ASSESSMENT — PAIN DESCRIPTION - FREQUENCY: FREQUENCY: CONTINUOUS

## 2025-06-20 ASSESSMENT — PAIN DESCRIPTION - DESCRIPTORS: DESCRIPTORS: ACHING

## 2025-06-20 ASSESSMENT — PAIN DESCRIPTION - LOCATION: LOCATION: BACK

## 2025-06-20 NOTE — PROGRESS NOTES
Pt here ambulatory using his cane  for a Venofer infusion  Pt reporting back discomfort rated a 3 out of 10 today Pt denies any questions or concerns with the infusion today  IV #24 was started in his Rt hand on first attempt per S John RN   Pt gus well  Venofer 200 mg IVPB was started  IV site unremarkable  Will monitor

## 2025-06-23 NOTE — DISCHARGE INSTRUCTIONS
What is this medicine?    IRON SUCROSE (JOSHUA tobin) is an iron complex. Iron is used to make healthy red blood cells, which carry oxygen and nutrients throughout the body. This medicine is used to treat iron deficiency anemia in people with chronic kidney disease.  This medicine may be used for other purposes; ask your health care provider or pharmacist if you have questions.     COMMON BRAND NAME(S): Venofer     What should I tell my health care provider before I take this medicine?    They need to know if you have any of these conditions:  -anemia not caused by low iron levels  -heart disease  -high levels of iron in the blood  -kidney disease  -liver disease  -an unusual or allergic reaction to iron, other medicines, foods, dyes, or preservatives  -pregnant or trying to get pregnant  -breast-feeding     How should I use this medicine?    This medicine is for infusion into a vein. It is given by a health care professional in a hospital or clinic setting.     What if I miss a dose?    It is important not to miss your dose. Call your doctor or health care professional if you are unable to keep an appointment.     What may interact with this medicine?    Do not take this medicine with any of the following medications:  -deferoxamine  -dimercaprol  -other iron products     This medicine may also interact with the following medications:  -chloramphenicol  -deferasirox     This list may not describe all possible interactions. Give your health care provider a list of all the medicines, herbs, non-prescription drugs, or dietary supplements you use. Also tell them if you smoke, drink alcohol, or use illegal drugs. Some items may interact with your medicine.     What should I watch for while using this medicine?    Visit your doctor or healthcare professional regularly. Tell your doctor or healthcare professional if your symptoms do not start to get better or if they get worse. You may need blood work done  while you are taking this medicine.     You may need to follow a special diet. Talk to your doctor. Foods that contain iron include: whole grains/cereals, dried fruits, beans, or peas, leafy green vegetables, and organ meats (liver, kidney).     What side effects may I notice from receiving this medicine?    Side effects that you should report to your doctor or health care professional as soon as possible:  -allergic reactions like skin rash, itching or hives, swelling of the face, lips, or tongue  -breathing problems  -changes in blood pressure  -cough  -fast, irregular heartbeat  -feeling faint or lightheaded, falls  -fever or chills  -flushing, sweating, or hot feelings  -joint or muscle aches/pains  -seizures  -swelling of the ankles or feet  -unusually weak or tired     Side effects that usually do not require medical attention (report to your doctor or health care professional if they continue or are bothersome):  -diarrhea  -feeling achy  -headache  -irritation at site where injected  -nausea, vomiting  -stomach upset  -tiredness    RETURN JULY 3,2025 AT 1100 FOR VENOFER INFUSION    FOLLOW UP WITH DR. BARRETT AS NEEDED OR SCHEDULED    CALL US IF YOU NEED TO RESCHEDULE YOUR APPOINTMENT  [phone]

## 2025-06-27 ENCOUNTER — HOSPITAL ENCOUNTER (OUTPATIENT)
Dept: NURSING | Age: 85
Setting detail: INFUSION SERIES
Discharge: HOME | End: 2025-06-27
Payer: MEDICARE

## 2025-06-27 VITALS
TEMPERATURE: 97.8 F | WEIGHT: 216.05 LBS | HEIGHT: 70 IN | SYSTOLIC BLOOD PRESSURE: 128 MMHG | HEART RATE: 68 BPM | BODY MASS INDEX: 30.93 KG/M2 | RESPIRATION RATE: 16 BRPM | DIASTOLIC BLOOD PRESSURE: 61 MMHG

## 2025-06-27 DIAGNOSIS — D64.9 ANEMIA, UNSPECIFIED TYPE: Primary | ICD-10-CM

## 2025-06-27 DIAGNOSIS — K90.9 IRON MALABSORPTION: ICD-10-CM

## 2025-06-27 DIAGNOSIS — D50.9 IRON DEFICIENCY ANEMIA, UNSPECIFIED IRON DEFICIENCY ANEMIA TYPE: ICD-10-CM

## 2025-06-27 PROCEDURE — 96365 THER/PROPH/DIAG IV INF INIT: CPT

## 2025-06-27 PROCEDURE — 99211 OFF/OP EST MAY X REQ PHY/QHP: CPT

## 2025-06-27 PROCEDURE — 6360000002 HC RX W HCPCS: Performed by: FAMILY MEDICINE

## 2025-06-27 PROCEDURE — 2580000003 HC RX 258: Performed by: FAMILY MEDICINE

## 2025-06-27 RX ADMIN — IRON SUCROSE 200 MG: 20 INJECTION, SOLUTION INTRAVENOUS at 10:50

## 2025-06-27 ASSESSMENT — PAIN SCALES - GENERAL: PAINLEVEL_OUTOF10: 0

## 2025-06-27 NOTE — PROGRESS NOTES
Venofer infused without any signs of adverse reactions  IV tubing was removed from pt and he was informed that I will remove INT closer to time of discharge  Pt stated ok No changes noted  Will monitor  Pt watching TV

## 2025-06-27 NOTE — PROGRESS NOTES
Pt here ambulatory using his cane  for a Venofer infusion  Pt without c/o's today and states he is starting to feel a little better  Pt denies any questions or concerns with the infusion today  IV #24 was started in his Rt hand on first attempt per S John EDOUARD   Pt gus well  Venofer 200 mg IVPB was started  IV site unremarkable  Will monitor

## 2025-07-03 ENCOUNTER — OFFICE VISIT (OUTPATIENT)
Dept: CARDIOLOGY CLINIC | Age: 85
End: 2025-07-03
Payer: MEDICARE

## 2025-07-03 ENCOUNTER — HOSPITAL ENCOUNTER (OUTPATIENT)
Dept: NURSING | Age: 85
Setting detail: INFUSION SERIES
Discharge: HOME | End: 2025-07-03
Payer: MEDICARE

## 2025-07-03 VITALS
HEART RATE: 68 BPM | HEIGHT: 70 IN | WEIGHT: 217.6 LBS | OXYGEN SATURATION: 95 % | DIASTOLIC BLOOD PRESSURE: 60 MMHG | BODY MASS INDEX: 31.15 KG/M2 | SYSTOLIC BLOOD PRESSURE: 144 MMHG

## 2025-07-03 VITALS
DIASTOLIC BLOOD PRESSURE: 71 MMHG | BODY MASS INDEX: 30.93 KG/M2 | HEART RATE: 58 BPM | TEMPERATURE: 97.8 F | SYSTOLIC BLOOD PRESSURE: 144 MMHG | HEIGHT: 70 IN | RESPIRATION RATE: 16 BRPM | WEIGHT: 216.05 LBS

## 2025-07-03 DIAGNOSIS — R06.09 DOE (DYSPNEA ON EXERTION): ICD-10-CM

## 2025-07-03 DIAGNOSIS — I48.19 PERSISTENT ATRIAL FIBRILLATION (HCC): Primary | ICD-10-CM

## 2025-07-03 DIAGNOSIS — D64.9 ANEMIA, UNSPECIFIED TYPE: Primary | ICD-10-CM

## 2025-07-03 DIAGNOSIS — I25.10 CORONARY ARTERY DISEASE INVOLVING NATIVE CORONARY ARTERY OF NATIVE HEART WITHOUT ANGINA PECTORIS: ICD-10-CM

## 2025-07-03 DIAGNOSIS — D50.9 IRON DEFICIENCY ANEMIA, UNSPECIFIED IRON DEFICIENCY ANEMIA TYPE: ICD-10-CM

## 2025-07-03 DIAGNOSIS — D64.9 ANEMIA, UNSPECIFIED TYPE: ICD-10-CM

## 2025-07-03 DIAGNOSIS — Z51.81 ENCOUNTER FOR MONITORING SOTALOL THERAPY: ICD-10-CM

## 2025-07-03 DIAGNOSIS — K90.9 IRON MALABSORPTION: ICD-10-CM

## 2025-07-03 DIAGNOSIS — R06.02 SHORTNESS OF BREATH: ICD-10-CM

## 2025-07-03 DIAGNOSIS — R07.9 CHEST PAIN, UNSPECIFIED TYPE: ICD-10-CM

## 2025-07-03 DIAGNOSIS — R42 DIZZINESS: ICD-10-CM

## 2025-07-03 DIAGNOSIS — Z79.899 ENCOUNTER FOR MONITORING SOTALOL THERAPY: ICD-10-CM

## 2025-07-03 DIAGNOSIS — R00.2 PALPITATIONS: ICD-10-CM

## 2025-07-03 PROCEDURE — 3077F SYST BP >= 140 MM HG: CPT | Performed by: INTERNAL MEDICINE

## 2025-07-03 PROCEDURE — 6360000002 HC RX W HCPCS: Performed by: FAMILY MEDICINE

## 2025-07-03 PROCEDURE — 1159F MED LIST DOCD IN RCRD: CPT | Performed by: INTERNAL MEDICINE

## 2025-07-03 PROCEDURE — 2580000003 HC RX 258: Performed by: FAMILY MEDICINE

## 2025-07-03 PROCEDURE — 3078F DIAST BP <80 MM HG: CPT | Performed by: INTERNAL MEDICINE

## 2025-07-03 PROCEDURE — 96365 THER/PROPH/DIAG IV INF INIT: CPT

## 2025-07-03 PROCEDURE — 99214 OFFICE O/P EST MOD 30 MIN: CPT | Performed by: INTERNAL MEDICINE

## 2025-07-03 PROCEDURE — 1123F ACP DISCUSS/DSCN MKR DOCD: CPT | Performed by: INTERNAL MEDICINE

## 2025-07-03 PROCEDURE — 99211 OFF/OP EST MAY X REQ PHY/QHP: CPT

## 2025-07-03 PROCEDURE — 93000 ELECTROCARDIOGRAM COMPLETE: CPT | Performed by: INTERNAL MEDICINE

## 2025-07-03 RX ADMIN — IRON SUCROSE 200 MG: 20 INJECTION, SOLUTION INTRAVENOUS at 11:00

## 2025-07-03 ASSESSMENT — PAIN SCALES - GENERAL: PAINLEVEL_OUTOF10: 3

## 2025-07-03 ASSESSMENT — PAIN DESCRIPTION - DESCRIPTORS: DESCRIPTORS: ACHING

## 2025-07-03 ASSESSMENT — PAIN DESCRIPTION - FREQUENCY: FREQUENCY: INTERMITTENT

## 2025-07-03 ASSESSMENT — PAIN DESCRIPTION - LOCATION: LOCATION: BACK

## 2025-07-03 ASSESSMENT — PAIN DESCRIPTION - PAIN TYPE: TYPE: CHRONIC PAIN

## 2025-07-03 ASSESSMENT — PAIN DESCRIPTION - ONSET: ONSET: ON-GOING

## 2025-07-03 NOTE — PROGRESS NOTES
Financial Resource Strain (CARDIA)     Difficulty of Paying Living Expenses: Not hard at all   Food Insecurity: No Food Insecurity (4/22/2025)    Hunger Vital Sign     Worried About Running Out of Food in the Last Year: Never true     Ran Out of Food in the Last Year: Never true   Transportation Needs: No Transportation Needs (4/22/2025)    PRAPARE - Transportation     Lack of Transportation (Medical): No     Lack of Transportation (Non-Medical): No   Physical Activity: Inactive (5/14/2025)    Exercise Vital Sign     Days of Exercise per Week: 0 days     Minutes of Exercise per Session: 0 min   Stress: Not on file   Social Connections: Not on file   Intimate Partner Violence: Not on file   Housing Stability: Low Risk  (4/22/2025)    Housing Stability Vital Sign     Unable to Pay for Housing in the Last Year: No     Number of Times Moved in the Last Year: 0     Homeless in the Last Year: No        ROS: Review Of Systems:  Review of Systems   Constitutional:  Negative for chills and fever.   HENT: Negative for sore throat.    Eyes: Negative for redness.   Respiratory: Negative for hemoptysis. Positive for shortness of breath.   Cardiovascular: Negative for chest pain and Positive for palpitations.   Gastrointestinal: Negative for blood in stool and melena.   Genitourinary: Negative for hematuria.   Musculoskeletal: Negative for falls.  Skin: Negative for rash.   Neurological: Negative loss of consciousness.  Endo/Heme/Allergies: Does not bleed easily.   Psychiatric/Behavioral: Negative for memory loss.         PHYSICAL EXAM:   BP (!) 144/60   Pulse 68   Ht 1.766 m (5' 9.53\")   Wt 98.7 kg (217 lb 9.6 oz)   SpO2 95%   BMI 31.65 kg/m²    Physical Exam   Constitutional: Appears well-developed.    Head: Normocephalic.   Eyes: Pupils are equal  Neck: Normal range of motion.   Cardiovascular: Normal rate and regular rhythm.    Pulmonary/Chest: Effort normal.   Abdominal: Soft.   Musculoskeletal: No pedal edema.

## 2025-07-03 NOTE — PROGRESS NOTES
Pt here ambulatory using a cane  for his last  Venofer infusion  Pt reporting some back discomfort today rated a 3 out of 10  Pt reports he thought he was getting better but now he isn't so sure  Pt reports still being tired and no energy to do much  Encouraged pt to follow up with his PCP  Pt reported that he would   IV #24 was started in his Rt hand on 2nd attempt per S John EDOUARD   Pt gus well  Venofer 200 mg IVPB was started  IV site unremarkable  Will monitor

## 2025-07-03 NOTE — PROGRESS NOTES
Venofer infused without any signs of adverse reactions  IV site unremarkable  IV tubing was removed from pt and he was informed that I will remove INT closer to time of discharge  Pt stated ok No changes noted  Will monitor  Pt watching TV

## 2025-07-17 ENCOUNTER — CLINICAL SUPPORT (OUTPATIENT)
Dept: FAMILY MEDICINE CLINIC | Age: 85
End: 2025-07-17

## 2025-07-17 DIAGNOSIS — D64.9 ANEMIA, UNSPECIFIED TYPE: ICD-10-CM

## 2025-07-17 DIAGNOSIS — R42 DIZZINESS: ICD-10-CM

## 2025-07-17 DIAGNOSIS — R06.02 SHORTNESS OF BREATH: ICD-10-CM

## 2025-07-17 DIAGNOSIS — I48.0 PAROXYSMAL ATRIAL FIBRILLATION (HCC): Primary | ICD-10-CM

## 2025-07-17 LAB
ANION GAP SERPL CALCULATED.3IONS-SCNC: 10 MMOL/L (ref 3–16)
BUN SERPL-MCNC: 16 MG/DL (ref 7–20)
CALCIUM SERPL-MCNC: 9.9 MG/DL (ref 8.3–10.6)
CHLORIDE SERPL-SCNC: 106 MMOL/L (ref 99–110)
CO2 SERPL-SCNC: 24 MMOL/L (ref 21–32)
CREAT SERPL-MCNC: 1 MG/DL (ref 0.8–1.3)
DEPRECATED RDW RBC AUTO: 20.3 % (ref 12.4–15.4)
GFR SERPLBLD CREATININE-BSD FMLA CKD-EPI: 74 ML/MIN/{1.73_M2}
GLUCOSE SERPL-MCNC: 120 MG/DL (ref 70–99)
HCT VFR BLD AUTO: 36 % (ref 40.5–52.5)
HGB BLD-MCNC: 11.7 G/DL (ref 13.5–17.5)
MCH RBC QN AUTO: 30.4 PG (ref 26–34)
MCHC RBC AUTO-ENTMCNC: 32.6 G/DL (ref 31–36)
MCV RBC AUTO: 93.2 FL (ref 80–100)
PLATELET # BLD AUTO: 166 K/UL (ref 135–450)
PMV BLD AUTO: 9.8 FL (ref 5–10.5)
POTASSIUM SERPL-SCNC: 4.6 MMOL/L (ref 3.5–5.1)
RBC # BLD AUTO: 3.86 M/UL (ref 4.2–5.9)
SODIUM SERPL-SCNC: 140 MMOL/L (ref 136–145)
TSH SERPL DL<=0.005 MIU/L-ACNC: 0.62 UIU/ML (ref 0.27–4.2)
WBC # BLD AUTO: 4.8 K/UL (ref 4–11)

## 2025-07-17 PROCEDURE — 36415 COLL VENOUS BLD VENIPUNCTURE: CPT | Performed by: NURSE PRACTITIONER

## 2025-07-22 ENCOUNTER — HOSPITAL ENCOUNTER (OUTPATIENT)
Age: 85
Discharge: HOME | End: 2025-07-24
Attending: INTERNAL MEDICINE
Payer: MEDICARE

## 2025-07-22 VITALS
DIASTOLIC BLOOD PRESSURE: 60 MMHG | HEIGHT: 69 IN | WEIGHT: 217 LBS | SYSTOLIC BLOOD PRESSURE: 144 MMHG | BODY MASS INDEX: 32.14 KG/M2

## 2025-07-22 DIAGNOSIS — I25.10 CORONARY ARTERY DISEASE INVOLVING NATIVE CORONARY ARTERY OF NATIVE HEART WITHOUT ANGINA PECTORIS: ICD-10-CM

## 2025-07-22 DIAGNOSIS — R06.09 DOE (DYSPNEA ON EXERTION): ICD-10-CM

## 2025-07-22 DIAGNOSIS — R06.02 SHORTNESS OF BREATH: ICD-10-CM

## 2025-07-22 DIAGNOSIS — I48.19 PERSISTENT ATRIAL FIBRILLATION (HCC): ICD-10-CM

## 2025-07-22 DIAGNOSIS — R00.2 PALPITATIONS: ICD-10-CM

## 2025-07-22 DIAGNOSIS — R07.9 CHEST PAIN, UNSPECIFIED TYPE: ICD-10-CM

## 2025-07-22 LAB
ECHO AO ROOT DIAM: 3.9 CM
ECHO AO ROOT INDEX: 1.82 CM/M2
ECHO AV MEAN GRADIENT: 3 MMHG
ECHO AV MEAN GRADIENT: 3 MMHG
ECHO AV MEAN VELOCITY: 0.8 M/S
ECHO AV PEAK GRADIENT: 6 MMHG
ECHO AV PEAK VELOCITY: 1.3 M/S
ECHO AV VELOCITY RATIO: 0.77
ECHO AV VTI: 26.9 CM
ECHO BSA: 2.19 M2
ECHO EST RA PRESSURE: 3 MMHG
ECHO IVC EXP: 2.1 CM
ECHO LA AREA 2C: 17.3 CM2
ECHO LA AREA 4C: 22 CM2
ECHO LA MAJOR AXIS: 6.3 CM
ECHO LA MINOR AXIS: 5.7 CM
ECHO LA VOL BP: 51 ML (ref 18–58)
ECHO LA VOL MOD A2C: 41 ML (ref 18–58)
ECHO LA VOL MOD A4C: 62 ML (ref 18–58)
ECHO LA VOL/BSA BIPLANE: 24 ML/M2 (ref 16–34)
ECHO LA VOLUME INDEX MOD A2C: 19 ML/M2 (ref 16–34)
ECHO LA VOLUME INDEX MOD A4C: 29 ML/M2 (ref 16–34)
ECHO LV E' LATERAL VELOCITY: 9.46 CM/S
ECHO LV E' SEPTAL VELOCITY: 7.07 CM/S
ECHO LV EDV A2C: 60 ML
ECHO LV EDV A4C: 73 ML
ECHO LV EDV INDEX A4C: 34 ML/M2
ECHO LV EDV NDEX A2C: 28 ML/M2
ECHO LV EF PHYSICIAN: 59 %
ECHO LV EJECTION FRACTION A2C: 58 %
ECHO LV EJECTION FRACTION A4C: 59 %
ECHO LV EJECTION FRACTION BIPLANE: 59 % (ref 55–100)
ECHO LV ESV A2C: 25 ML
ECHO LV ESV A4C: 30 ML
ECHO LV ESV INDEX A2C: 12 ML/M2
ECHO LV ESV INDEX A4C: 14 ML/M2
ECHO LV FRACTIONAL SHORTENING: 28 % (ref 28–44)
ECHO LV INTERNAL DIMENSION DIASTOLE INDEX: 2.34 CM/M2
ECHO LV INTERNAL DIMENSION DIASTOLIC: 5 CM (ref 4.2–5.9)
ECHO LV INTERNAL DIMENSION SYSTOLIC INDEX: 1.68 CM/M2
ECHO LV INTERNAL DIMENSION SYSTOLIC: 3.6 CM
ECHO LV ISOVOLUMETRIC RELAXATION TIME (IVRT): 113 MS
ECHO LV IVSD: 1 CM (ref 0.6–1)
ECHO LV MASS 2D: 182 G (ref 88–224)
ECHO LV MASS INDEX 2D: 85 G/M2 (ref 49–115)
ECHO LV POSTERIOR WALL DIASTOLIC: 1 CM (ref 0.6–1)
ECHO LV RELATIVE WALL THICKNESS RATIO: 0.4
ECHO LVOT AV VTI INDEX: 0.74
ECHO LVOT MEAN GRADIENT: 2 MMHG
ECHO LVOT PEAK GRADIENT: 4 MMHG
ECHO LVOT PEAK VELOCITY: 1 M/S
ECHO LVOT VTI: 19.9 CM
ECHO MV A VELOCITY: 0.82 M/S
ECHO MV E DECELERATION TIME (DT): 278 MS
ECHO MV E VELOCITY: 0.53 M/S
ECHO MV E/A RATIO: 0.65
ECHO MV E/E' LATERAL: 5.6
ECHO MV E/E' RATIO (AVERAGED): 6.55
ECHO MV E/E' SEPTAL: 7.5
ECHO MV LVOT VTI INDEX: 1.44
ECHO MV MAX VELOCITY: 0.9 M/S
ECHO MV MEAN GRADIENT: 1 MMHG
ECHO MV MEAN VELOCITY: 0.5 M/S
ECHO MV PEAK GRADIENT: 3 MMHG
ECHO MV VTI: 28.7 CM
ECHO PV MAX VELOCITY: 1 M/S
ECHO PV MEAN GRADIENT: 2 MMHG
ECHO PV MEAN VELOCITY: 0.7 M/S
ECHO PV PEAK GRADIENT: 4 MMHG
ECHO PV VTI: 24.8 CM
ECHO RA AREA 4C: 15.6 CM2
ECHO RA END SYSTOLIC VOLUME APICAL 4 CHAMBER INDEX BSA: 19 ML/M2
ECHO RA VOLUME: 41 ML
ECHO RV BASAL DIMENSION: 4 CM
ECHO RV FREE WALL PEAK S': 18.4 CM/S
ECHO RV LONGITUDINAL DIMENSION: 8.3 CM
ECHO RV MID DIMENSION: 3.1 CM
ECHO RV TAPSE: 2.5 CM (ref 1.7–?)

## 2025-07-22 PROCEDURE — 93306 TTE W/DOPPLER COMPLETE: CPT

## 2025-07-22 PROCEDURE — 93306 TTE W/DOPPLER COMPLETE: CPT | Performed by: INTERNAL MEDICINE

## 2025-08-04 ENCOUNTER — PATIENT MESSAGE (OUTPATIENT)
Dept: FAMILY MEDICINE CLINIC | Age: 85
End: 2025-08-04

## 2025-08-04 DIAGNOSIS — I10 PRIMARY HYPERTENSION: ICD-10-CM

## 2025-08-04 DIAGNOSIS — I48.0 PAROXYSMAL ATRIAL FIBRILLATION (HCC): ICD-10-CM

## 2025-08-04 RX ORDER — SOTALOL HYDROCHLORIDE 80 MG/1
40 TABLET ORAL 2 TIMES DAILY
Qty: 60 TABLET | Refills: 2 | Status: SHIPPED | OUTPATIENT
Start: 2025-08-04 | End: 2025-08-08 | Stop reason: SDUPTHER

## 2025-08-08 RX ORDER — SOTALOL HYDROCHLORIDE 80 MG/1
40 TABLET ORAL 2 TIMES DAILY
Qty: 60 TABLET | Refills: 2 | Status: SHIPPED | OUTPATIENT
Start: 2025-08-08

## (undated) DEVICE — YANKAUER,BULB TIP,W/O VENT,RIGID,STERILE: Brand: MEDLINE

## (undated) DEVICE — SUTURE ABSORBABLE MONOFILAMENT 0 CT-1 36 MM DYED SPRL PDS + SXPP1B450

## (undated) DEVICE — GLOVE ORANGE PI 7 1/2   MSG9075

## (undated) DEVICE — OPTIFOAM GENTLE SA, POSTOP, 4X10: Brand: MEDLINE

## (undated) DEVICE — S/M FLEXIBLE ALEXIS ORTHOPAEDIC PROTECTOR: Brand: ALEXIS® ORTHOPAEDIC PROTECTOR

## (undated) DEVICE — DRAPE C ARM UNIV W41XL74IN CLR PLAS XR VELC CLSR POLY STRP

## (undated) DEVICE — MAT 40INX72IN TRK FLR OB

## (undated) DEVICE — HOOD, PEEL-AWAY: Brand: FLYTE

## (undated) DEVICE — GLOVE ORANGE PI 8   MSG9080

## (undated) DEVICE — 450 ML BOTTLE OF 0.05% CHLORHEXIDINE GLUCONATE IN 99.95% STERILE WATER FOR IRRIGATION, USP AND APPLICATOR.: Brand: IRRISEPT ANTIMICROBIAL WOUND LAVAGE

## (undated) DEVICE — SUTURE VICRYL + SZ 0 L27IN ABSRB UD L36MM CT-1 1/2 CIR VCPP41D

## (undated) DEVICE — COVER,TABLE,44X90,STERILE: Brand: MEDLINE

## (undated) DEVICE — SUTURE ETHIBOND EXCEL SZ 0 L18IN NONABSORBABLE GRN L36MM CT-1 CX21D

## (undated) DEVICE — BIPOLAR SEALER 23-112-1 AQM 6.0: Brand: AQUAMANTYS ®

## (undated) DEVICE — SYRINGE 20ML LL S/C 50

## (undated) DEVICE — SYSTEM SKIN CLOSURE 42CM DERMABOND PRINEO

## (undated) DEVICE — HANDPIECE SET WITH HIGH FLOW TIP AND SUCTION TUBE: Brand: INTERPULSE

## (undated) DEVICE — SUTURE MONOCRYL + SZ 4-0 L18IN ABSRB UD L19MM PS-2 3/8 CIR MCP496G

## (undated) DEVICE — CRADLE POS PRONE 24 X 5 X 3 IN ARM N COMPR NO CVR FOAM DISP

## (undated) DEVICE — SUTURE VICRYL SZ 2-0 L18IN ABSRB UD CT-1 L36MM 1/2 CIR J839D

## (undated) DEVICE — COVER,MAYO STAND,STERILE: Brand: MEDLINE